# Patient Record
Sex: FEMALE | Race: BLACK OR AFRICAN AMERICAN | NOT HISPANIC OR LATINO | URBAN - METROPOLITAN AREA
[De-identification: names, ages, dates, MRNs, and addresses within clinical notes are randomized per-mention and may not be internally consistent; named-entity substitution may affect disease eponyms.]

---

## 2016-03-13 RX ORDER — ONDANSETRON 8 MG/1
1 TABLET, FILM COATED ORAL
Qty: 9 | Refills: 0 | OUTPATIENT
Start: 2016-03-13 | End: 2017-08-23

## 2016-11-17 RX ORDER — ESOMEPRAZOLE MAGNESIUM 40 MG/1
1 CAPSULE, DELAYED RELEASE ORAL
Qty: 30 | Refills: 0
Start: 2016-11-17 | End: 2017-09-19

## 2017-08-20 ENCOUNTER — EMERGENCY (EMERGENCY)
Facility: HOSPITAL | Age: 27
LOS: 1 days | Discharge: HOME | End: 2017-08-20
Attending: EMERGENCY MEDICINE
Payer: MEDICAID

## 2017-08-20 VITALS
RESPIRATION RATE: 18 BRPM | TEMPERATURE: 98 F | HEART RATE: 72 BPM | DIASTOLIC BLOOD PRESSURE: 70 MMHG | SYSTOLIC BLOOD PRESSURE: 131 MMHG | OXYGEN SATURATION: 100 %

## 2017-08-20 VITALS
RESPIRATION RATE: 18 BRPM | HEIGHT: 65 IN | HEART RATE: 100 BPM | DIASTOLIC BLOOD PRESSURE: 78 MMHG | TEMPERATURE: 99 F | SYSTOLIC BLOOD PRESSURE: 142 MMHG | OXYGEN SATURATION: 100 % | WEIGHT: 244.93 LBS

## 2017-08-20 DIAGNOSIS — K42.9 UMBILICAL HERNIA WITHOUT OBSTRUCTION OR GANGRENE: ICD-10-CM

## 2017-08-20 DIAGNOSIS — Z91.013 ALLERGY TO SEAFOOD: ICD-10-CM

## 2017-08-20 DIAGNOSIS — E66.9 OBESITY, UNSPECIFIED: ICD-10-CM

## 2017-08-20 DIAGNOSIS — K21.9 GASTRO-ESOPHAGEAL REFLUX DISEASE WITHOUT ESOPHAGITIS: ICD-10-CM

## 2017-08-20 DIAGNOSIS — R10.13 EPIGASTRIC PAIN: ICD-10-CM

## 2017-08-20 DIAGNOSIS — Z87.11 PERSONAL HISTORY OF PEPTIC ULCER DISEASE: ICD-10-CM

## 2017-08-20 DIAGNOSIS — R11.2 NAUSEA WITH VOMITING, UNSPECIFIED: ICD-10-CM

## 2017-08-20 DIAGNOSIS — K80.20 CALCULUS OF GALLBLADDER WITHOUT CHOLECYSTITIS WITHOUT OBSTRUCTION: ICD-10-CM

## 2017-08-20 LAB
ALBUMIN SERPL ELPH-MCNC: 3.7 G/DL — SIGNIFICANT CHANGE UP (ref 3.5–5)
ALP SERPL-CCNC: 110 U/L — SIGNIFICANT CHANGE UP (ref 40–120)
ALT FLD-CCNC: 26 U/L DA — SIGNIFICANT CHANGE UP (ref 10–60)
ANION GAP SERPL CALC-SCNC: 9 MMOL/L — SIGNIFICANT CHANGE UP (ref 5–17)
APPEARANCE UR: CLEAR — SIGNIFICANT CHANGE UP
AST SERPL-CCNC: 21 U/L — SIGNIFICANT CHANGE UP (ref 10–40)
BASOPHILS # BLD AUTO: 0.1 K/UL — SIGNIFICANT CHANGE UP (ref 0–0.2)
BASOPHILS NFR BLD AUTO: 1.3 % — SIGNIFICANT CHANGE UP (ref 0–2)
BILIRUB SERPL-MCNC: 0.5 MG/DL — SIGNIFICANT CHANGE UP (ref 0.2–1.2)
BILIRUB UR-MCNC: NEGATIVE — SIGNIFICANT CHANGE UP
BUN SERPL-MCNC: 11 MG/DL — SIGNIFICANT CHANGE UP (ref 7–18)
CALCIUM SERPL-MCNC: 8.9 MG/DL — SIGNIFICANT CHANGE UP (ref 8.4–10.5)
CHLORIDE SERPL-SCNC: 107 MMOL/L — SIGNIFICANT CHANGE UP (ref 96–108)
CO2 SERPL-SCNC: 24 MMOL/L — SIGNIFICANT CHANGE UP (ref 22–31)
COLOR SPEC: YELLOW — SIGNIFICANT CHANGE UP
CREAT SERPL-MCNC: 0.96 MG/DL — SIGNIFICANT CHANGE UP (ref 0.5–1.3)
DIFF PNL FLD: ABNORMAL
EOSINOPHIL # BLD AUTO: 0 K/UL — SIGNIFICANT CHANGE UP (ref 0–0.5)
EOSINOPHIL NFR BLD AUTO: 0.3 % — SIGNIFICANT CHANGE UP (ref 0–6)
GLUCOSE SERPL-MCNC: 118 MG/DL — HIGH (ref 70–99)
GLUCOSE UR QL: NEGATIVE — SIGNIFICANT CHANGE UP
HCG SERPL-ACNC: <1 MIU/ML — SIGNIFICANT CHANGE UP
HCG UR QL: NEGATIVE — SIGNIFICANT CHANGE UP
HCT VFR BLD CALC: 42.2 % — SIGNIFICANT CHANGE UP (ref 34.5–45)
HGB BLD-MCNC: 14 G/DL — SIGNIFICANT CHANGE UP (ref 11.5–15.5)
KETONES UR-MCNC: ABNORMAL
LEUKOCYTE ESTERASE UR-ACNC: NEGATIVE — SIGNIFICANT CHANGE UP
LIDOCAIN IGE QN: 89 U/L — SIGNIFICANT CHANGE UP (ref 73–393)
LYMPHOCYTES # BLD AUTO: 1.6 K/UL — SIGNIFICANT CHANGE UP (ref 1–3.3)
LYMPHOCYTES # BLD AUTO: 16.9 % — SIGNIFICANT CHANGE UP (ref 13–44)
MCHC RBC-ENTMCNC: 25.8 PG — LOW (ref 27–34)
MCHC RBC-ENTMCNC: 33.1 GM/DL — SIGNIFICANT CHANGE UP (ref 32–36)
MCV RBC AUTO: 77.9 FL — LOW (ref 80–100)
MONOCYTES # BLD AUTO: 0.4 K/UL — SIGNIFICANT CHANGE UP (ref 0–0.9)
MONOCYTES NFR BLD AUTO: 4.3 % — SIGNIFICANT CHANGE UP (ref 2–14)
NEUTROPHILS # BLD AUTO: 7.1 K/UL — SIGNIFICANT CHANGE UP (ref 1.8–7.4)
NEUTROPHILS NFR BLD AUTO: 77.2 % — HIGH (ref 43–77)
NITRITE UR-MCNC: NEGATIVE — SIGNIFICANT CHANGE UP
OB PNL STL: NEGATIVE — SIGNIFICANT CHANGE UP
PH UR: 6 — SIGNIFICANT CHANGE UP (ref 5–8)
PLATELET # BLD AUTO: 358 K/UL — SIGNIFICANT CHANGE UP (ref 150–400)
POTASSIUM SERPL-MCNC: 4.3 MMOL/L — SIGNIFICANT CHANGE UP (ref 3.5–5.3)
POTASSIUM SERPL-SCNC: 4.3 MMOL/L — SIGNIFICANT CHANGE UP (ref 3.5–5.3)
PROT SERPL-MCNC: 8.6 G/DL — HIGH (ref 6–8.3)
PROT UR-MCNC: 100
RBC # BLD: 5.42 M/UL — HIGH (ref 3.8–5.2)
RBC # FLD: 14.2 % — SIGNIFICANT CHANGE UP (ref 10.3–14.5)
SODIUM SERPL-SCNC: 140 MMOL/L — SIGNIFICANT CHANGE UP (ref 135–145)
SP GR SPEC: 1.02 — SIGNIFICANT CHANGE UP (ref 1.01–1.02)
UROBILINOGEN FLD QL: 1
WBC # BLD: 9.2 K/UL — SIGNIFICANT CHANGE UP (ref 3.8–10.5)
WBC # FLD AUTO: 9.2 K/UL — SIGNIFICANT CHANGE UP (ref 3.8–10.5)

## 2017-08-20 PROCEDURE — 81025 URINE PREGNANCY TEST: CPT

## 2017-08-20 PROCEDURE — 76705 ECHO EXAM OF ABDOMEN: CPT

## 2017-08-20 PROCEDURE — 74174 CTA ABD&PLVS W/CONTRAST: CPT

## 2017-08-20 PROCEDURE — 99285 EMERGENCY DEPT VISIT HI MDM: CPT

## 2017-08-20 PROCEDURE — 81001 URINALYSIS AUTO W/SCOPE: CPT

## 2017-08-20 PROCEDURE — 84702 CHORIONIC GONADOTROPIN TEST: CPT

## 2017-08-20 PROCEDURE — 36415 COLL VENOUS BLD VENIPUNCTURE: CPT

## 2017-08-20 PROCEDURE — 96376 TX/PRO/DX INJ SAME DRUG ADON: CPT

## 2017-08-20 PROCEDURE — 96375 TX/PRO/DX INJ NEW DRUG ADDON: CPT

## 2017-08-20 PROCEDURE — 82272 OCCULT BLD FECES 1-3 TESTS: CPT

## 2017-08-20 PROCEDURE — 96374 THER/PROPH/DIAG INJ IV PUSH: CPT | Mod: XU

## 2017-08-20 PROCEDURE — 76705 ECHO EXAM OF ABDOMEN: CPT | Mod: 26

## 2017-08-20 PROCEDURE — 85027 COMPLETE CBC AUTOMATED: CPT

## 2017-08-20 PROCEDURE — 74174 CTA ABD&PLVS W/CONTRAST: CPT | Mod: 26

## 2017-08-20 PROCEDURE — 71275 CT ANGIOGRAPHY CHEST: CPT | Mod: 26

## 2017-08-20 PROCEDURE — 71275 CT ANGIOGRAPHY CHEST: CPT

## 2017-08-20 PROCEDURE — 83690 ASSAY OF LIPASE: CPT

## 2017-08-20 PROCEDURE — 99284 EMERGENCY DEPT VISIT MOD MDM: CPT | Mod: 25

## 2017-08-20 PROCEDURE — 80053 COMPREHEN METABOLIC PANEL: CPT

## 2017-08-20 RX ORDER — MORPHINE SULFATE 50 MG/1
4 CAPSULE, EXTENDED RELEASE ORAL ONCE
Qty: 0 | Refills: 0 | Status: DISCONTINUED | OUTPATIENT
Start: 2017-08-20 | End: 2017-08-20

## 2017-08-20 RX ORDER — FAMOTIDINE 10 MG/ML
20 INJECTION INTRAVENOUS ONCE
Qty: 0 | Refills: 0 | Status: COMPLETED | OUTPATIENT
Start: 2017-08-20 | End: 2017-08-20

## 2017-08-20 RX ORDER — ONDANSETRON 8 MG/1
4 TABLET, FILM COATED ORAL ONCE
Qty: 0 | Refills: 0 | Status: COMPLETED | OUTPATIENT
Start: 2017-08-20 | End: 2017-08-20

## 2017-08-20 RX ORDER — LIDOCAINE 4 G/100G
10 CREAM TOPICAL ONCE
Qty: 0 | Refills: 0 | Status: COMPLETED | OUTPATIENT
Start: 2017-08-20 | End: 2017-08-20

## 2017-08-20 RX ORDER — SODIUM CHLORIDE 9 MG/ML
1000 INJECTION INTRAMUSCULAR; INTRAVENOUS; SUBCUTANEOUS ONCE
Qty: 0 | Refills: 0 | Status: COMPLETED | OUTPATIENT
Start: 2017-08-20 | End: 2017-08-20

## 2017-08-20 RX ADMIN — ONDANSETRON 4 MILLIGRAM(S): 8 TABLET, FILM COATED ORAL at 11:41

## 2017-08-20 RX ADMIN — Medication 30 MILLILITER(S): at 12:45

## 2017-08-20 RX ADMIN — FAMOTIDINE 20 MILLIGRAM(S): 10 INJECTION INTRAVENOUS at 12:46

## 2017-08-20 RX ADMIN — MORPHINE SULFATE 4 MILLIGRAM(S): 50 CAPSULE, EXTENDED RELEASE ORAL at 15:56

## 2017-08-20 RX ADMIN — ONDANSETRON 4 MILLIGRAM(S): 8 TABLET, FILM COATED ORAL at 17:05

## 2017-08-20 RX ADMIN — FAMOTIDINE 20 MILLIGRAM(S): 10 INJECTION INTRAVENOUS at 17:05

## 2017-08-20 RX ADMIN — MORPHINE SULFATE 4 MILLIGRAM(S): 50 CAPSULE, EXTENDED RELEASE ORAL at 14:42

## 2017-08-20 RX ADMIN — SODIUM CHLORIDE 2000 MILLILITER(S): 9 INJECTION INTRAMUSCULAR; INTRAVENOUS; SUBCUTANEOUS at 11:41

## 2017-08-20 RX ADMIN — LIDOCAINE 10 MILLILITER(S): 4 CREAM TOPICAL at 12:45

## 2017-08-20 NOTE — ED PROVIDER NOTE - ATTENDING CONTRIBUTION TO CARE
** Delayed chart entry (item noted to be missing following audit)**   28 y/o with epigastric abd pain. exam w/o significant ttp. Pt with known history of GERD and possible peptic ulcer will give GI cocktail, check labs, hepatic sonogram due to body habitus.

## 2017-08-20 NOTE — ED PROVIDER NOTE - MEDICAL DECISION MAKING DETAILS
26 y/o with epigastric abd pain. Pt with known history of GERD and possible peptic ulcer will give GI cocktail, check labs, hepatic sonogram due to body habitus. Contact Dr. Saldana for consult.

## 2017-08-20 NOTE — ED PROVIDER NOTE - PROGRESS NOTE DETAILS
Pt confirms resolution of vomiting, and decreased epigastric discomfort, sx suggestive of pt ulceration flair, attempted to consult with pt GI MD Dr. GIOVANNA Ibarra,to no avail, will place pt with care coordinator, send meds to pharmacy.

## 2017-08-20 NOTE — ED PROVIDER NOTE - OBJECTIVE STATEMENT
28 y/o F pt with PMHx of GERD, obesity and ulcer( possibly stomach) diagnosed via endoscopy (12/2016) and is healing presents to the ED c/o sharp, epigastric abd pain  vomiting with streaks of blood, soft stools x1 day. Pt states she feels when ulcer had flared up in the past; used to take Omeprazole does not require it anymore. Pt denies any fever, chills, hematuria, melena, HA, dizziness, sick contacts, new foods, or any other complaints.  Allergies: Shellfish

## 2019-10-24 ENCOUNTER — EMERGENCY (EMERGENCY)
Facility: HOSPITAL | Age: 29
LOS: 1 days | Discharge: HOME | End: 2019-10-24
Attending: EMERGENCY MEDICINE
Payer: COMMERCIAL

## 2019-10-24 VITALS
HEART RATE: 78 BPM | SYSTOLIC BLOOD PRESSURE: 104 MMHG | TEMPERATURE: 98 F | WEIGHT: 249.12 LBS | HEIGHT: 64 IN | DIASTOLIC BLOOD PRESSURE: 63 MMHG | RESPIRATION RATE: 19 BRPM | OXYGEN SATURATION: 99 %

## 2019-10-24 PROCEDURE — 99284 EMERGENCY DEPT VISIT MOD MDM: CPT

## 2019-10-24 PROCEDURE — 93971 EXTREMITY STUDY: CPT

## 2019-10-24 PROCEDURE — 99284 EMERGENCY DEPT VISIT MOD MDM: CPT | Mod: 25

## 2019-10-24 PROCEDURE — 93971 EXTREMITY STUDY: CPT | Mod: 26,RT

## 2019-10-24 RX ORDER — IBUPROFEN 200 MG
1 TABLET ORAL
Qty: 28 | Refills: 0
Start: 2019-10-24 | End: 2019-10-30

## 2019-10-24 NOTE — ED PROVIDER NOTE - PATIENT PORTAL LINK FT
You can access the FollowMyHealth Patient Portal offered by [facility] by registering at the following website: [email or web address]. By joining [employer] [facility]’s FollowMyHealth portal, you will also be able to view your health information using other applications (apps) compatible with our system.

## 2019-10-24 NOTE — ED PROVIDER NOTE - OBJECTIVE STATEMENT
30 y/o F pt with a PMHx of DVT (October 2016), GERD, Obesity, and Ulcers presents to ED c/o R calf pain and swelling associated with SOB x3 days. Pt endorses similar feeling to her previous DVT in October of 2016. Pt states she is a  and relates a familial Hx of DM from both sides. Pt denies cough, cold, runny nose, stuffy nose, or any other acute complaints. NKDA.

## 2019-10-24 NOTE — ED PROVIDER NOTE - PMH
DVT (deep venous thrombosis)    GERD (gastroesophageal reflux disease)    Obesity (BMI 30-39.9)    Ulcer

## 2019-10-25 PROBLEM — E66.9 OBESITY, UNSPECIFIED: Chronic | Status: ACTIVE | Noted: 2017-08-24

## 2019-10-25 PROBLEM — K21.9 GASTRO-ESOPHAGEAL REFLUX DISEASE WITHOUT ESOPHAGITIS: Chronic | Status: ACTIVE | Noted: 2017-08-24

## 2019-10-25 PROBLEM — L98.499 NON-PRESSURE CHRONIC ULCER OF SKIN OF OTHER SITES WITH UNSPECIFIED SEVERITY: Chronic | Status: ACTIVE | Noted: 2017-08-24

## 2019-11-13 PROBLEM — I82.409 ACUTE EMBOLISM AND THROMBOSIS OF UNSPECIFIED DEEP VEINS OF UNSPECIFIED LOWER EXTREMITY: Chronic | Status: ACTIVE | Noted: 2019-10-24

## 2020-01-15 ENCOUNTER — APPOINTMENT (OUTPATIENT)
Dept: NEUROLOGY | Facility: CLINIC | Age: 30
End: 2020-01-15
Payer: COMMERCIAL

## 2020-01-15 VITALS
DIASTOLIC BLOOD PRESSURE: 74 MMHG | HEART RATE: 86 BPM | HEIGHT: 65 IN | WEIGHT: 266 LBS | OXYGEN SATURATION: 98 % | SYSTOLIC BLOOD PRESSURE: 136 MMHG | BODY MASS INDEX: 44.32 KG/M2

## 2020-01-15 DIAGNOSIS — Z87.898 PERSONAL HISTORY OF OTHER SPECIFIED CONDITIONS: ICD-10-CM

## 2020-01-15 DIAGNOSIS — R20.0 ANESTHESIA OF SKIN: ICD-10-CM

## 2020-01-15 DIAGNOSIS — Z78.9 OTHER SPECIFIED HEALTH STATUS: ICD-10-CM

## 2020-01-15 DIAGNOSIS — Z86.718 PERSONAL HISTORY OF OTHER VENOUS THROMBOSIS AND EMBOLISM: ICD-10-CM

## 2020-01-15 DIAGNOSIS — Z83.3 FAMILY HISTORY OF DIABETES MELLITUS: ICD-10-CM

## 2020-01-15 DIAGNOSIS — R20.2 ANESTHESIA OF SKIN: ICD-10-CM

## 2020-01-15 DIAGNOSIS — Z82.5 FAMILY HISTORY OF ASTHMA AND OTHER CHRONIC LOWER RESPIRATORY DISEASES: ICD-10-CM

## 2020-01-15 DIAGNOSIS — Z81.1 FAMILY HISTORY OF ALCOHOL ABUSE AND DEPENDENCE: ICD-10-CM

## 2020-01-15 PROCEDURE — 99205 OFFICE O/P NEW HI 60 MIN: CPT

## 2020-01-15 NOTE — REVIEW OF SYSTEMS
[As Noted in HPI] : as noted in HPI [Fever] : no fever [Anxiety] : no anxiety [Eyesight Problems] : no eyesight problems [Loss Of Hearing] : no hearing loss [Chest Pain] : no chest pain [Cough] : no cough [Vomiting] : no vomiting [Incontinence] : no incontinence [Joint Pain] : no joint pain [Itching] : no itching [Muscle Weakness] : no muscle weakness [Easy Bleeding] : no tendency for easy bleeding

## 2020-01-15 NOTE — PHYSICAL EXAM
[General Appearance - In No Acute Distress] : in no acute distress [General Appearance - Alert] : alert [Person] : oriented to person [Place] : oriented to place [Time] : oriented to time [Registration Intact] : recent registration memory intact [Concentration Intact] : normal concentrating ability [Visual Intact] : visual attention was ~T not ~L decreased [Naming Objects] : no difficulty naming common objects [Repeating Phrases] : no difficulty repeating a phrase [Fluency] : fluency intact [Comprehension] : comprehension intact [Vocabulary] : adequate range of vocabulary [Cranial Nerves Optic (II)] : visual acuity intact bilaterally,  visual fields full to confrontation, pupils equal round and reactive to light [Cranial Nerves Oculomotor (III)] : extraocular motion intact [Cranial Nerves Trigeminal (V)] : facial sensation intact symmetrically [Cranial Nerves Facial (VII)] : face symmetrical [Cranial Nerves Vestibulocochlear (VIII)] : hearing was intact bilaterally [Cranial Nerves Glossopharyngeal (IX)] : tongue and palate midline [Cranial Nerves Accessory (XI - Cranial And Spinal)] : head turning and shoulder shrug symmetric [Cranial Nerves Hypoglossal (XII)] : there was no tongue deviation with protrusion [Motor Tone] : muscle tone was normal in all four extremities [Motor Strength] : muscle strength was normal in all four extremities [Involuntary Movements] : no involuntary movements were seen [Sensation Tactile Decrease] : light touch was intact [Sensation Pain / Temperature Decrease] : pain and temperature was intact [Sensation Vibration Decrease] : vibration was intact [Proprioception] : proprioception was intact [Abnormal Walk] : normal gait [Balance] : balance was intact [1+] : Ankle jerk left 1+ [Full Pulse] : the pedal pulses are present [Romberg's Sign] : Romberg's sign was negtive [Coordination - Dysmetria Impaired Finger-to-Nose Bilateral] : not present [Coordination - Dysmetria Impaired Heel-to-Shin Bilateral] : not present [Plantar Reflex Right Only] : normal on the right [Plantar Reflex Left Only] : normal on the left

## 2020-01-15 NOTE — HISTORY OF PRESENT ILLNESS
[FreeTextEntry1] : The patient is here for numbness and tingling in bilateral arms and legs started summer 2019.  There is no weakness in the arms or the legs.  The patient denies any neck or back pain.  The patient has family history of diabetic neuropathy.\par \par No difficulty with balance, walking or dizziness.  \par

## 2020-01-15 NOTE — ASSESSMENT
[FreeTextEntry1] : Numbness and tingling bilateral arms and legs concerning for neuropathy\par will check basic neuropathy labs, ncs/emg and start Cymbalta 30mg daily

## 2020-01-15 NOTE — DATA REVIEWED
[de-identified] : Glu 118\par \par EXAM:  US DPLX LWR EXT VEINS LTD RT                      \par \par \par PROCEDURE DATE:  10/24/2019  \par \par \par \par INTERPRETATION:  Date of study: 10/24/19.\par \par CLINICAL INFORMATION: Right calf pain and swelling - r/o DVT.\par \par COMPARISON: None available.\par \par TECHNIQUE: Duplex sonography of the RIGHT LOWER extremity veins with \par color and spectral Doppler, with and without compression.  \par \par FINDINGS:\par \par There is normal compressibility of the right common femoral, femoral and \par popliteal veins. \par \par Doppler examination shows normal spontaneous and phasic flow.\par \par No calf vein thrombosis is detected.\par \par IMPRESSION: \par \par No evidence of right lower extremity deep venous thrombosis.\par \par \par \par \par \par \par \par \par JENNY VICK M.D., ATTENDING RADIOLOGIST\par This document has been electronically signed. Oct 24 2019  7:34PM\par   \par last ED note appreciated

## 2020-08-26 ENCOUNTER — LABORATORY RESULT (OUTPATIENT)
Age: 30
End: 2020-08-26

## 2020-08-26 ENCOUNTER — APPOINTMENT (OUTPATIENT)
Dept: INTERNAL MEDICINE | Facility: CLINIC | Age: 30
End: 2020-08-26
Payer: COMMERCIAL

## 2020-08-26 VITALS
SYSTOLIC BLOOD PRESSURE: 114 MMHG | BODY MASS INDEX: 43.99 KG/M2 | RESPIRATION RATE: 16 BRPM | DIASTOLIC BLOOD PRESSURE: 75 MMHG | HEART RATE: 78 BPM | OXYGEN SATURATION: 97 % | TEMPERATURE: 98 F | HEIGHT: 65 IN | WEIGHT: 264 LBS

## 2020-08-26 DIAGNOSIS — Z00.00 ENCOUNTER FOR GENERAL ADULT MEDICAL EXAMINATION W/OUT ABNORMAL FINDINGS: ICD-10-CM

## 2020-08-26 DIAGNOSIS — Z11.59 ENCOUNTER FOR SCREENING FOR OTHER VIRAL DISEASES: ICD-10-CM

## 2020-08-26 PROCEDURE — 99385 PREV VISIT NEW AGE 18-39: CPT | Mod: 25

## 2020-08-26 PROCEDURE — 99202 OFFICE O/P NEW SF 15 MIN: CPT

## 2020-08-26 RX ORDER — DULOXETINE HYDROCHLORIDE 30 MG/1
30 CAPSULE, DELAYED RELEASE PELLETS ORAL
Qty: 30 | Refills: 5 | Status: DISCONTINUED | COMMUNITY
Start: 2020-01-15 | End: 2020-08-26

## 2020-08-26 RX ORDER — ASPIRIN 81 MG
81 TABLET, DELAYED RELEASE (ENTERIC COATED) ORAL
Qty: 30 | Refills: 2 | Status: ACTIVE | COMMUNITY

## 2020-08-26 RX ORDER — AMOXICILLIN 500 MG/1
CAPSULE ORAL
Refills: 0 | Status: DISCONTINUED | COMMUNITY
End: 2020-08-26

## 2020-09-01 NOTE — REVIEW OF SYSTEMS
[Fever] : no fever [Chills] : no chills [Fatigue] : no fatigue [Discharge] : no discharge [Pain] : no pain [Redness] : no redness [Vision Problems] : no vision problems [Itching] : no itching [Earache] : no earache [Hearing Loss] : no hearing loss [Nasal Discharge] : no nasal discharge [Sore Throat] : no sore throat [Chest Pain] : no chest pain [Palpitations] : no palpitations [Leg Claudication] : no leg claudication [Orthopnea] : no orthopnea [Paroxysmal Nocturnal Dyspnea] : no paroxysmal nocturnal dyspnea [Shortness Of Breath] : no shortness of breath [Wheezing] : no wheezing [Cough] : no cough [Dyspnea on Exertion] : no dyspnea on exertion [Abdominal Pain] : no abdominal pain [Nausea] : no nausea [Constipation] : no constipation [Diarrhea] : diarrhea [Vomiting] : no vomiting [Heartburn] : no heartburn [Melena] : no melena [Dysuria] : no dysuria [Incontinence] : no incontinence [Hematuria] : no hematuria [Frequency] : no frequency [Joint Pain] : no joint pain [Joint Stiffness] : no joint stiffness [Joint Swelling] : no joint swelling [Muscle Pain] : no muscle pain [Back Pain] : no back pain [Mole Changes] : no mole changes [Skin Rash] : no skin rash [Headache] : no headache [Dizziness] : no dizziness [Fainting] : no fainting [Memory Loss] : no memory loss [Unsteady Walking] : no ataxia [Suicidal] : not suicidal [Insomnia] : no insomnia [Anxiety] : no anxiety [Depression] : no depression [de-identified] : as per HPI

## 2020-09-01 NOTE — ASSESSMENT
[FreeTextEntry1] : has STDtest done with her gyn in 08/2020 with negative results per pt \par \par decline Tdap and flushot

## 2020-09-01 NOTE — HEALTH RISK ASSESSMENT
[Yes] : Yes [1 or 2 (0 pts)] : 1 or 2 (0 points) [Monthly or less (1 pt)] : Monthly or less (1 point) [Never (0 pts)] : Never (0 points) [No] : In the past 12 months have you used drugs other than those required for medical reasons? No [No falls in past year] : Patient reported no falls in the past year [0] : 2) Feeling down, depressed, or hopeless: Not at all (0) [Patient reported PAP Smear was normal] : Patient reported PAP Smear was normal [HIV Test offered] : HIV Test offered [Hepatitis C test offered] : Hepatitis C test offered [With Family] : lives with family [Sexually Active] : sexually active [Employed] : employed [Feels Safe at Home] : Feels safe at home [Fully functional (using the telephone, shopping, preparing meals, housekeeping, doing laundry, using] : Fully functional and needs no help or supervision to perform IADLs (using the telephone, shopping, preparing meals, housekeeping, doing laundry, using transportation, managing medications and managing finances) [Fully functional (bathing, dressing, toileting, transferring, walking, feeding)] : Fully functional (bathing, dressing, toileting, transferring, walking, feeding) [] : No [Audit-CScore] : 1 [EJH2Scawm] : 0 [Change in mental status noted] : No change in mental status noted [Language] : denies difficulty with language [Behavior] : denies difficulty with behavior [Learning/Retaining New Information] : denies difficulty learning/retaining new information [Handling Complex Tasks] : denies difficulty handling complex tasks [Reasoning] : denies difficulty with reasoning [Spatial Ability and Orientation] : denies difficulty with spatial ability and orientation [High Risk Behavior] : no high risk behavior [PapSmearDate] : 08/2020 [FreeTextEntry2] : as parking   [de-identified] : LONG TERM RELATIONSHIP WITH ONE MALE PARTNER WITHOUT PROTECTION

## 2020-09-01 NOTE — HISTORY OF PRESENT ILLNESS
[de-identified] : 30  female w/pmhx of unprovoked DVT 10/2016, non bleeding gastric ulcer,obesity, GERD, cholelithiasis, NUMBNESS of b/l hand comes in to establish care \par \par -last EGD in 2018; now has appointment with  in 09/2020; \par \par -saw GYN in 08/2020 with normal pap per pt\par \par -Pt was seeing hematologist every 3 months; will see her today; has work up done of DVT per pt; denies of legs pain or swelling or SOB now \par \par -numbness and tingling of b/l arms and legs; saw neuro has not done any labs as ordered \par -LMP: 08/26/2020; regular period\par \par -EATING HEALTHY BUT NOT EXERCISE; STATE WALK A LOT AT HER WORK

## 2020-09-01 NOTE — PHYSICAL EXAM
[No Acute Distress] : no acute distress [Well Nourished] : well nourished [Well Developed] : well developed [Normal Sclera/Conjunctiva] : normal sclera/conjunctiva [PERRL] : pupils equal round and reactive to light [EOMI] : extraocular movements intact [Normal Outer Ear/Nose] : the outer ears and nose were normal in appearance [Normal Oropharynx] : the oropharynx was normal [No JVD] : no jugular venous distention [No Lymphadenopathy] : no lymphadenopathy [Supple] : supple [No Respiratory Distress] : no respiratory distress  [No Accessory Muscle Use] : no accessory muscle use [Clear to Auscultation] : lungs were clear to auscultation bilaterally [Normal Rate] : normal rate  [Regular Rhythm] : with a regular rhythm [Normal S1, S2] : normal S1 and S2 [No Murmur] : no murmur heard [Pedal Pulses Present] : the pedal pulses are present [No Edema] : there was no peripheral edema [Soft] : abdomen soft [Non Tender] : non-tender [Non-distended] : non-distended [No Masses] : no abdominal mass palpated [Normal Bowel Sounds] : normal bowel sounds [Normal Posterior Cervical Nodes] : no posterior cervical lymphadenopathy [Normal Anterior Cervical Nodes] : no anterior cervical lymphadenopathy [No CVA Tenderness] : no CVA  tenderness [No Spinal Tenderness] : no spinal tenderness [No Joint Swelling] : no joint swelling [Grossly Normal Strength/Tone] : grossly normal strength/tone [Coordination Grossly Intact] : coordination grossly intact [No Focal Deficits] : no focal deficits [Normal Gait] : normal gait [Speech Grossly Normal] : speech grossly normal [Memory Grossly Normal] : memory grossly normal [Normal Affect] : the affect was normal [Alert and Oriented x3] : oriented to person, place, and time [Normal Mood] : the mood was normal [Normal Insight/Judgement] : insight and judgment were intact

## 2020-09-07 DIAGNOSIS — R73.03 PREDIABETES.: ICD-10-CM

## 2020-09-07 DIAGNOSIS — R82.90 UNSPECIFIED ABNORMAL FINDINGS IN URINE: ICD-10-CM

## 2020-09-07 DIAGNOSIS — E78.5 HYPERLIPIDEMIA, UNSPECIFIED: ICD-10-CM

## 2020-09-07 LAB
25(OH)D3 SERPL-MCNC: 34.7 NG/ML
ALBUMIN MFR SERPL ELPH: 53.1 %
ALBUMIN SERPL ELPH-MCNC: 4 G/DL
ALBUMIN SERPL-MCNC: 3.8 G/DL
ALBUMIN/GLOB SERPL: 1.2 RATIO
ALP BLD-CCNC: 98 U/L
ALPHA1 GLOB MFR SERPL ELPH: 4.9 %
ALPHA1 GLOB SERPL ELPH-MCNC: 0.3 G/DL
ALPHA2 GLOB MFR SERPL ELPH: 9.6 %
ALPHA2 GLOB SERPL ELPH-MCNC: 0.7 G/DL
ALT SERPL-CCNC: 18 U/L
ANION GAP SERPL CALC-SCNC: 14 MMOL/L
APPEARANCE: CLEAR
AST SERPL-CCNC: 21 U/L
B-GLOBULIN MFR SERPL ELPH: 12.4 %
B-GLOBULIN SERPL ELPH-MCNC: 0.9 G/DL
BASOPHILS # BLD AUTO: 0.04 K/UL
BASOPHILS NFR BLD AUTO: 0.5 %
BILIRUB SERPL-MCNC: <0.2 MG/DL
BILIRUBIN URINE: NEGATIVE
BLOOD URINE: ABNORMAL
BUN SERPL-MCNC: 13 MG/DL
CALCIUM SERPL-MCNC: 9.2 MG/DL
CHLORIDE SERPL-SCNC: 101 MMOL/L
CHOLEST SERPL-MCNC: 216 MG/DL
CHOLEST/HDLC SERPL: 4.4 RATIO
CO2 SERPL-SCNC: 26 MMOL/L
COLOR: YELLOW
CREAT SERPL-MCNC: 0.84 MG/DL
EOSINOPHIL # BLD AUTO: 0.2 K/UL
EOSINOPHIL NFR BLD AUTO: 2.7 %
ESTIMATED AVERAGE GLUCOSE: 120 MG/DL
FOLATE RBC-MCNC: 1323 NG/ML
GAMMA GLOB FLD ELPH-MCNC: 1.4 G/DL
GAMMA GLOB MFR SERPL ELPH: 20 %
GLUCOSE QUALITATIVE U: NEGATIVE
GLUCOSE SERPL-MCNC: 116 MG/DL
HBA1C MFR BLD HPLC: 5.8 %
HCT VFR BLD CALC: 39.9 %
HCT VFR BLD CALC: 40.3 %
HCYS SERPL-MCNC: 7.6 UMOL/L
HDLC SERPL-MCNC: 49 MG/DL
HGB BLD-MCNC: 12.9 G/DL
IMM GRANULOCYTES NFR BLD AUTO: 0.3 %
INTERPRETATION SERPL IEP-IMP: NORMAL
KETONES URINE: NORMAL
LDLC SERPL CALC-MCNC: 150 MG/DL
LEUKOCYTE ESTERASE URINE: NEGATIVE
LYMPHOCYTES # BLD AUTO: 2.86 K/UL
LYMPHOCYTES NFR BLD AUTO: 39.1 %
M PROTEIN SPEC IFE-MCNC: NORMAL
MAN DIFF?: NORMAL
MCHC RBC-ENTMCNC: 24.4 PG
MCHC RBC-ENTMCNC: 32 GM/DL
MCV RBC AUTO: 76.2 FL
METHYLMALONATE SERPL-SCNC: 95 NMOL/L
MONOCYTES # BLD AUTO: 0.48 K/UL
MONOCYTES NFR BLD AUTO: 6.6 %
NEUTROPHILS # BLD AUTO: 3.72 K/UL
NEUTROPHILS NFR BLD AUTO: 50.8 %
NITRITE URINE: NEGATIVE
PH URINE: 6
PLATELET # BLD AUTO: 378 K/UL
POTASSIUM SERPL-SCNC: 4.3 MMOL/L
PROT SERPL-MCNC: 7.1 G/DL
PROTEIN URINE: ABNORMAL
RBC # BLD: 5.29 M/UL
RBC # FLD: 16.2 %
SARS-COV-2 IGG SERPL IA-ACNC: <3.8 AU/ML
SARS-COV-2 IGG SERPL QL IA: NEGATIVE
SODIUM SERPL-SCNC: 141 MMOL/L
SPECIFIC GRAVITY URINE: 1.03
T3 SERPL-MCNC: 135 NG/DL
T4 FREE SERPL-MCNC: 1.1 NG/DL
TRIGL SERPL-MCNC: 83 MG/DL
TSH SERPL-ACNC: 1.86 UIU/ML
UROBILINOGEN URINE: NORMAL
VIT B12 SERPL-MCNC: 1341 PG/ML
WBC # FLD AUTO: 7.32 K/UL

## 2020-09-10 ENCOUNTER — APPOINTMENT (OUTPATIENT)
Dept: DERMATOLOGY | Facility: CLINIC | Age: 30
End: 2020-09-10
Payer: COMMERCIAL

## 2020-09-10 DIAGNOSIS — L21.9 SEBORRHEIC DERMATITIS, UNSPECIFIED: ICD-10-CM

## 2020-09-10 DIAGNOSIS — L70.0 ACNE VULGARIS: ICD-10-CM

## 2020-09-10 DIAGNOSIS — D22.9 MELANOCYTIC NEVI, UNSPECIFIED: ICD-10-CM

## 2020-09-10 DIAGNOSIS — Z12.83 ENCOUNTER FOR SCREENING FOR MALIGNANT NEOPLASM OF SKIN: ICD-10-CM

## 2020-09-10 DIAGNOSIS — Z87.2 PERSONAL HISTORY OF DISEASES OF THE SKIN AND SUBCUTANEOUS TISSUE: ICD-10-CM

## 2020-09-10 DIAGNOSIS — Z84.0 FAMILY HISTORY OF DISEASES OF THE SKIN AND SUBCUTANEOUS TISSUE: ICD-10-CM

## 2020-09-10 PROCEDURE — 99203 OFFICE O/P NEW LOW 30 MIN: CPT | Mod: GC

## 2020-09-10 RX ORDER — FLUOCINOLONE ACETONIDE 0.11 MG/ML
0.01 OIL TOPICAL
Qty: 1 | Refills: 2 | Status: ACTIVE | COMMUNITY
Start: 2020-09-10 | End: 1900-01-01

## 2020-09-18 ENCOUNTER — TRANSCRIPTION ENCOUNTER (OUTPATIENT)
Age: 30
End: 2020-09-18

## 2020-09-18 ENCOUNTER — APPOINTMENT (OUTPATIENT)
Dept: NEUROLOGY | Facility: CLINIC | Age: 30
End: 2020-09-18
Payer: COMMERCIAL

## 2020-09-18 VITALS
WEIGHT: 265 LBS | SYSTOLIC BLOOD PRESSURE: 136 MMHG | TEMPERATURE: 98.1 F | DIASTOLIC BLOOD PRESSURE: 81 MMHG | OXYGEN SATURATION: 98 % | HEIGHT: 65 IN | BODY MASS INDEX: 44.15 KG/M2 | HEART RATE: 88 BPM

## 2020-09-18 PROCEDURE — 95886 MUSC TEST DONE W/N TEST COMP: CPT

## 2020-09-18 PROCEDURE — 99214 OFFICE O/P EST MOD 30 MIN: CPT

## 2020-09-18 PROCEDURE — 95912 NRV CNDJ TEST 11-12 STUDIES: CPT

## 2020-09-18 NOTE — ASSESSMENT
[FreeTextEntry1] : Numbness and tingling bilateral arms and legs with  normal ncs/emg in pre-DM patient concerning for small fiber neuropathy\par will start Cymbalta 30mg daily. \par get skin biopsy for small fiber neuropathy\par patient advised on weight loss anf working with her PMD to manage pre-DM

## 2020-09-18 NOTE — DATA REVIEWED
[de-identified] : basic neuropathy labs notable for HbA1C of 5.8\par ncs.emg  normal\par PMD note appreciated

## 2020-09-18 NOTE — PHYSICAL EXAM
[General Appearance - Alert] : alert [General Appearance - In No Acute Distress] : in no acute distress [Person] : oriented to person [Place] : oriented to place [Time] : oriented to time [Concentration Intact] : normal concentrating ability [Naming Objects] : no difficulty naming common objects [Fluency] : fluency intact [Comprehension] : comprehension intact [Vocabulary] : adequate range of vocabulary [Motor Tone] : muscle tone was normal in all four extremities [Motor Strength] : muscle strength was normal in all four extremities [Involuntary Movements] : no involuntary movements were seen [Sensation Tactile Decrease] : light touch was intact [Abnormal Walk] : normal gait [Balance] : balance was intact [FreeTextEntry7] : stocking glove sensory loss bl arms and legs to pp

## 2020-09-18 NOTE — HISTORY OF PRESENT ILLNESS
[FreeTextEntry1] : The patient is here for numbness and tingling in bilateral arms and legs started summer 2019. There is no weakness in the arms or the legs. The patient denies any neck or back pain. The patient has family history of diabetic neuropathy.\par \par No difficulty with balance, walking or dizziness. \par The patient has not yet tried the Cymbalta.\par

## 2020-09-20 ENCOUNTER — TRANSCRIPTION ENCOUNTER (OUTPATIENT)
Age: 30
End: 2020-09-20

## 2020-09-20 RX ORDER — OMEPRAZOLE 20 MG
20 TABLET, DELAYED RELEASE (ENTERIC COATED) ORAL
Qty: 30 | Refills: 2 | Status: ACTIVE | COMMUNITY
Start: 1900-01-01 | End: 1900-01-01

## 2020-09-23 ENCOUNTER — APPOINTMENT (OUTPATIENT)
Dept: NEUROLOGY | Facility: CLINIC | Age: 30
End: 2020-09-23
Payer: COMMERCIAL

## 2020-09-23 VITALS
TEMPERATURE: 98.6 F | BODY MASS INDEX: 44.15 KG/M2 | WEIGHT: 265 LBS | HEIGHT: 65 IN | DIASTOLIC BLOOD PRESSURE: 91 MMHG | OXYGEN SATURATION: 98 % | HEART RATE: 74 BPM | SYSTOLIC BLOOD PRESSURE: 130 MMHG

## 2020-09-23 PROCEDURE — 11104 PUNCH BX SKIN SINGLE LESION: CPT

## 2020-09-23 NOTE — PROCEDURE
[FreeTextEntry1] : skin biopsy [FreeTextEntry2] : small fiber neuropathy [FreeTextEntry4] : Lidocaine 2% [FreeTextEntry3] : PRE-OP DIAGNOSIS: small fiber neuropathy\par POST-OP DIAGNOSIS: Same \par PROCEDURE: skin biopsy\par Performing Physician: Sarai Benavides MD, MPH\par \par PROCEDURE: \par _ Shave Biopsy _ Excisional Biopsy x_ [facility]\par  \par Informed consent was obtained from the patient. The area surrounding the skin lesion was prepared and draped in the usual sterile manner. The area was numbed with subcutaneous 2% Lidocaine.   The lesion was removed in the usual manner by the biopsy method noted above from the right distal leg and proximal thigh. Hemostasis was assured. The patient tolerated the procedure well. Skin biopsy sample to be send to [facility].\par \par Closure: _ Monsel’s for hemostasis _ suture _  _X None\par  \par Followup: The patient tolerated the procedure well without complications. Standard post-procedure care is explained and return precautions are given.\par

## 2020-09-23 NOTE — HISTORY OF PRESENT ILLNESS
[FreeTextEntry1] : istory of Present Illness\par The patient is here for numbness and tingling in bilateral arms and legs started summer 2019. There is no weakness in the arms or the legs. The patient denies any neck or back pain. The patient has family history of diabetic neuropathy.\par \par No difficulty with balance, walking or dizziness. \par The patient has not yet tried the Cymbalta.\par

## 2020-09-23 NOTE — ASSESSMENT
[FreeTextEntry1] : Numbness and tingling bilateral arms and legs with normal ncs/emg in pre-DM patient concerning for small fiber neuropathy\par will start Cymbalta 30mg daily. \par get skin biopsy for small fiber neuropathy\par patient advised on weight loss anf working with her PMD to manage pre-DM.

## 2020-09-24 ENCOUNTER — TRANSCRIPTION ENCOUNTER (OUTPATIENT)
Age: 30
End: 2020-09-24

## 2020-09-24 RX ORDER — DULOXETINE HYDROCHLORIDE 30 MG/1
30 CAPSULE, DELAYED RELEASE PELLETS ORAL
Qty: 90 | Refills: 0 | Status: ACTIVE | COMMUNITY
Start: 2020-09-18 | End: 1900-01-01

## 2020-10-07 ENCOUNTER — TRANSCRIPTION ENCOUNTER (OUTPATIENT)
Age: 30
End: 2020-10-07

## 2020-10-08 ENCOUNTER — LABORATORY RESULT (OUTPATIENT)
Age: 30
End: 2020-10-08

## 2020-10-08 ENCOUNTER — TRANSCRIPTION ENCOUNTER (OUTPATIENT)
Age: 30
End: 2020-10-08

## 2020-10-08 ENCOUNTER — APPOINTMENT (OUTPATIENT)
Dept: INTERNAL MEDICINE | Facility: CLINIC | Age: 30
End: 2020-10-08
Payer: COMMERCIAL

## 2020-10-08 VITALS
HEART RATE: 80 BPM | TEMPERATURE: 100.2 F | BODY MASS INDEX: 43.49 KG/M2 | OXYGEN SATURATION: 98 % | SYSTOLIC BLOOD PRESSURE: 133 MMHG | DIASTOLIC BLOOD PRESSURE: 81 MMHG | HEIGHT: 65 IN | WEIGHT: 261 LBS

## 2020-10-08 DIAGNOSIS — K80.20 CALCULUS OF GALLBLADDER W/OUT CHOLECYSTITIS W/OUT OBSTRUCTION: ICD-10-CM

## 2020-10-08 DIAGNOSIS — R79.89 OTHER SPECIFIED ABNORMAL FINDINGS OF BLOOD CHEMISTRY: ICD-10-CM

## 2020-10-08 DIAGNOSIS — E66.01 MORBID (SEVERE) OBESITY DUE TO EXCESS CALORIES: ICD-10-CM

## 2020-10-08 DIAGNOSIS — Z01.818 ENCOUNTER FOR OTHER PREPROCEDURAL EXAMINATION: ICD-10-CM

## 2020-10-08 LAB
BASOPHILS # BLD AUTO: 0.04 K/UL
BASOPHILS NFR BLD AUTO: 0.5 %
EOSINOPHIL # BLD AUTO: 0.14 K/UL
EOSINOPHIL NFR BLD AUTO: 1.6 %
HCT VFR BLD CALC: 41.2 %
HGB BLD-MCNC: 13.8 G/DL
IMM GRANULOCYTES NFR BLD AUTO: 0.1 %
LYMPHOCYTES # BLD AUTO: 3.59 K/UL
LYMPHOCYTES NFR BLD AUTO: 42.1 %
MAN DIFF?: NORMAL
MCHC RBC-ENTMCNC: 25 PG
MCHC RBC-ENTMCNC: 33.5 GM/DL
MCV RBC AUTO: 74.5 FL
MONOCYTES # BLD AUTO: 0.58 K/UL
MONOCYTES NFR BLD AUTO: 6.8 %
NEUTROPHILS # BLD AUTO: 4.16 K/UL
NEUTROPHILS NFR BLD AUTO: 48.9 %
PLATELET # BLD AUTO: 351 K/UL
RBC # BLD: 5.53 M/UL
RBC # FLD: 17.3 %
WBC # FLD AUTO: 8.52 K/UL

## 2020-10-08 PROCEDURE — 82270 OCCULT BLOOD FECES: CPT

## 2020-10-08 PROCEDURE — 99244 OFF/OP CNSLTJ NEW/EST MOD 40: CPT | Mod: 25

## 2020-10-08 NOTE — ASSESSMENT
[FreeTextEntry1] : 1gerd- discussed Rule of 2's; pt should avoid eating too much; too fast; too spicy; too lousy; less than two hours before bed \par -Things to avoid including overeating, spicy foods, tight clothing, eating within three hours of bed, this list is not all inclusive. \par -For treatment of reflux, possible options discussed including diet control, H2 blockers, PPIs, as well as coating motility agents discussed as treatment options. Timing of meals and proximity of last meal to sleep were discussed. If symptoms persist, a formal gastrointestinal evaluation is needed. \par \par Gastroesophageal reflux disease (GERD) occurs when stomach acid frequently flows back into the tube connecting your mouth and stomach (esophagus). This backwash (acid reflux) can irritate the lining of your esophagus.\par \par Many people experience acid reflux from time to time. GERD is mild acid reflux that occurs at least twice a week, or moderate to severe acid reflux that occurs at least once a week.\par \par \par Most people can manage the discomfort of GERD with lifestyle changes and over-the-counter medications. But some people with GERD may need stronger medications or surgery to ease symptoms.\par \par Symptoms\par \par Common signs and symptoms of GERD include:\par •A burning sensation in your chest (heartburn), usually after eating, which might be worse at night\par •Chest pain\par •Difficulty swallowing\par •Regurgitation of food or sour liquid\par •Sensation of a lump in your throat\par \par If you have nighttime acid reflux, you might also experience:\par •Chronic cough\par •Laryngitis\par •New or worsening asthma\par •Disrupted sleep\par \par When to see a doctor\par \par Seek immediate medical care if you have chest pain, especially if you also have shortness of breath, or jaw or arm pain. These may be signs and symptoms of a heart attack.\par \par Make an appointment with your doctor if you:\par •Experience severe or frequent GERD symptoms\par •Take over-the-counter medications for heartburn more than twice a week\par \par Causes\par \par GERD is caused by frequent acid reflux.\par \par When you swallow, a circular band of muscle around the bottom of your esophagus (lower esophageal sphincter) relaxes to allow food and liquid to flow into your stomach. Then the sphincter closes again.\par \par If the sphincter relaxes abnormally or weakens, stomach acid can flow back up into your esophagus. This constant backwash of acid irritates the lining of your esophagus, often causing it to become inflamed.\par \par Risk factors\par \par Conditions that can increase your risk of GERD include:\par •Obesity\par •Bulging of the top of the stomach up into the diaphragm (hiatal hernia)\par •Pregnancy\par •Connective tissue disorders, such as scleroderma\par •Delayed stomach emptying\par \par Factors that can aggravate acid reflux include:\par •Smoking\par •Eating large meals or eating late at night\par •Eating certain foods (triggers) such as fatty or fried foods\par •Drinking certain beverages, such as alcohol or coffee\par •Taking certain medications, such as aspirin\par \par Complications\par \par Over time, chronic inflammation in your esophagus can cause:\par •Narrowing of the esophagus (esophageal stricture). Damage to the lower esophagus from stomach acid causes scar tissue to form. The scar tissue narrows the food pathway, leading to problems with swallowing.\par •An open sore in the esophagus (esophageal ulcer). Stomach acid can wear away tissue in the esophagus, causing an open sore to form. An esophageal ulcer can bleed, cause pain and make swallowing difficult.\par •Precancerous changes to the esophagus (Neal's esophagus). Damage from acid can cause changes in the tissue lining the lower esophagus. These changes are associated with an increased risk of esophageal cancer\par she will have egd for  evaluation of chronic gerd and ulcers  Upper endoscopy is a procedure that lets a doctor look at the lining of the upper digestive tract. The upper digestive tract includes the esophagus (the tube than connects the mouth to the stomach), the stomach, and the duodenum (the first part of the small intestine).\par \par \par Why might my doctor do an upper endoscopy?\par You might have an upper endoscopy if you have:\par \par ?Pain in your upper belly that you cannot explain\par \par ?A condition called "acid reflux"\par \par ?Nausea and vomiting that has lasted a long time\par \par ?Diarrhea that has lasted a long time\par \par ?Black bowel movements or blood in your vomit\par \par ?Trouble swallowing or a feeling of food getting stuck in your throat\par \par ?Abnormal results from other tests of your digestive system\par \par ?Swallowed an object that you should not have swallowed\par \par ?Had growths or ulcers in your digestive tract, and your doctor wants to follow up\par \par \par \par What should I do before an upper endoscopy?\par Your doctor will give you instructions about what to do before an upper endoscopy. He or she will tell you if you need to stop eating or drinking, or stop taking any of your usual medicines beforehand. Make sure to read the instructions as soon as you get them. You might have to stop some medicines up to a week before the test. Let your doctor know if you have trouble getting ready for your upper endoscopy.\par \par \par What happens during an upper endoscopy?\par Your doctor will give you an IV, a thin tube that goes into a vein. You will get medicines through the IV to make you feel relaxed. He or she might give you a mouth spray or gargle to numb your mouth. You will also get a plastic mouth guard to protect your teeth.\par \par Then your doctor will put a thin tube with a camera and light on the end into your mouth and down into your esophagus, stomach, and duodenum. He or she will look for irritation, bleeding, ulcers, or growths.\par \par During an upper endoscopy, your doctor might also:\par \par ?Do a test called a biopsy – During a biopsy, a doctor takes a small piece of tissue from the lining of the digestive tract. (You will not feel this.) Then he or she looks at the tissue under a microscope.\par \par \par ?Treat problems that he or she sees – For example, a doctor can stop bleeding or sometimes remove a growth. He or she can also widen any narrow areas of the esophagus. Narrow areas of the esophagus can cause trouble swallowing.\par \par \par \par What happens after an upper endoscopy?\par After an upper endoscopy, you will be watched for 1 to 2 hours until the medicines wear off. Most doctors recommend that people not drive or go to work right after an upper endoscopy. Most can drive and go back to work the next day.\par \par \par What are the side effects of an upper endoscopy?\par The most common side effect is feeling bloated. Some people have nausea because of the medicines used before the procedure. If this happens to you, your doctor can give you medicine to make the nausea better. Most people can eat as usual after the procedure.\par \par Other side effects are not as common, but can occur. These can include:\par \par ?Food from the stomach getting into the lungs\par \par ?Bleeding, for example, after a growth is removed\par \par ?Getting a tear in the digestive tract lining\par \par ?Having redness or swelling of the skin around the IV\par \par \par \par Should I call my doctor or nurse?\par Call your doctor or nurse immediately if you have any of the following problems after your upper endoscopy:\par \par ?Belly pain that is much worse than gas pain or cramps\par \par ?A bloated and hard belly\par \par ?Vomiting\par \par ?Fever\par \par ?Trouble swallowing or severe throat pain\par \par ?Black bowel movements\par \par ?A "crunching" feeling under the skin in the neck\par 2 bmi 43 -  risks of obesity and need for diet and eating healthy Obesity is a complex disorder involving an excessive amount of body fat. Obesity isn't just a cosmetic concern. It increases your risk of diseases and health problems, such as heart disease, diabetes and high blood pressure.\par \par Being extremely obese means you are especially likely to have health problems related to your weight.\par \par \par The good news is that even modest weight loss can improve or prevent the health problems associated with obesity. Dietary changes, increased physical activity and behavior changes can help you lose weight. Prescription medications and weight-loss surgery are additional options for treating obesity.\par \par \par \par \par Symptoms\par \par Obesity is diagnosed when your body mass index (BMI) is 30 or higher. Your body mass index is calculated by dividing your weight in kilograms (kg) by your height in meters (m) squared. \par \par \par BMI\par \par Weight status\par \par \par Below 18.5 Underweight \par 18.5-24.9 Normal \par 25.0-29.9 Overweight \par 30.0-34.9 Obese (Class I) \par 35.0-39.9 Obese (Class II) \par 40.0 and higher Extreme obesity (Class III) \par \par For most people, BMI provides a reasonable estimate of body fat. However, BMI doesn't directly measure body fat, so some people, such as muscular athletes, may have a BMI in the obese category even though they don't have excess body fat. Ask your doctor if your BMI is a problem. \par \par When to see a doctor\par \par If you think you may be obese, and especially if you're concerned about weight-related health problems, see your doctor or health care provider. You and your provider can evaluate your health risks and discuss your weight-loss options. \par \par Request an Appointment at [facility]\par \par Causes\par \par Although there are genetic, behavioral and hormonal influences on body weight, obesity occurs when you take in more calories than you burn through exercise and normal daily activities. Your body stores these excess calories as fat.\par \par Obesity can sometimes be traced to a medical cause, such as Prader-Willi syndrome, Cushing's syndrome, and other diseases and conditions. However, these disorders are rare and, in general, the principal causes of obesity are:\par •Inactivity. If you're not very active, you don't burn as many calories. With a sedentary lifestyle, you can easily take in more calories every day than you use through exercise and normal daily activities.\par •Unhealthy diet and eating habits. Weight gain is inevitable if you regularly eat more calories than you burn. And most Americans' diets are too high in calories and are full of fast food and high-calorie beverages.\par \par Risk factors\par \par Obesity usually results from a combination of causes and contributing factors, including:\par •Genetics. Your genes may affect the amount of body fat you store, and where that fat is distributed. Genetics may also play a role in how efficiently your body converts food into energy and how your body burns calories during exercise.\par •Family lifestyle. Obesity tends to run in families. If one or both of your parents are obese, your risk of being obese is increased. That's not just because of genetics. Family members tend to share similar eating and activity habits.\par •Inactivity. If you're not very active, you don't burn as many calories. With a sedentary lifestyle, you can easily take in more calories every day than you burn through exercise and routine daily activities. Having medical problems, such as arthritis, can lead to decreased activity, which contributes to weight gain.\par •Unhealthy diet. A diet that's high in calories, lacking in fruits and vegetables, full of fast food, and laden with high-calorie beverages and oversized portions contributes to weight gain.\par •Medical problems. In some people, obesity can be traced to a medical cause, such as Prader-Willi syndrome, Cushing's syndrome and other conditions. Medical problems, such as arthritis, also can lead to decreased activity, which may result in weight gain.\par •Certain medications. Some medications can lead to weight gain if you don't compensate through diet or activity. These medications include some antidepressants, anti-seizure medications, diabetes medications, antipsychotic medications, steroids and beta blockers.\par •Social and economic issues. Research has linked social and economic factors to obesity. Avoiding obesity is difficult if you don't have safe areas to exercise. Similarly, you may not have been taught healthy ways of cooking, or you may not have money to buy healthier foods. In addition, the people you spend time with may influence your weight — you're more likely to become obese if you have obese friends or relatives.\par •Age. Obesity can occur at any age, even in young children. But as you age, hormonal changes and a less active lifestyle increase your risk of obesity. In addition, the amount of muscle in your body tends to decrease with age. This lower muscle mass leads to a decrease in metabolism. These changes also reduce calorie needs, and can make it harder to keep off excess weight. If you don't consciously control what you eat and become more physically active as you age, you'll likely gain weight.\par •Pregnancy. During pregnancy, a woman's weight necessarily increases. Some women find this weight difficult to lose after the baby is born. This weight gain may contribute to the development of obesity in women.\par •Quitting smoking. Quitting smoking is often associated with weight gain. And for some, it can lead to enough weight gain that the person becomes obese. In the long run, however, quitting smoking is still a greater benefit to your health than continuing to smoke.\par •Lack of sleep. Not getting enough sleep or getting too much sleep can cause changes in hormones that increase your appetite. You may also crave foods high in calories and carbohydrates, which can contribute to weight gain.\par \par Even if you have one or more of these risk factors, it doesn't mean that you're destined to become obese. You can counteract most risk factors through diet, physical activity and exercise, and behavior changes.\par \par Complications\par \par If you're obese, you're more likely to develop a number of potentially serious health problems, including:\par •High triglycerides and low high-density lipoprotein (HDL) cholesterol\par •Type 2 diabetes\par •High blood pressure\par •Metabolic syndrome — a combination of high blood sugar, high blood pressure, high triglycerides and low HDL cholesterol\par •Heart disease\par •Stroke\par •Cancer, including cancer of the uterus, cervix, endometrium, ovaries, breast, colon, rectum, esophagus, liver, gallbladder, pancreas, kidney and prostate\par •Breathing disorders, including sleep apnea, a potentially serious sleep disorder in which breathing repeatedly stops and starts\par •Gallbladder disease\par •Gynecological problems, such as infertility and irregular periods\par •Erectile dysfunction and sexual health issues\par •Nonalcoholic fatty liver disease, a condition in which fat builds up in the liver and can cause inflammation or scarring\par •Osteoarthritis\par \par Quality of life\par \par When you're obese, your overall quality of life may be diminished. You may not be able to do things you used to do, such as participating in enjoyable activities. You may avoid public places. Obese people may even encounter discrimination.\par \par Other weight-related issues that may affect your quality of life include:\par •Depression\par •Disability\par •Sexual problems\par •Shame and guilt\par •Social isolation\par •Lower work achievement\par \par Prevention\par \par Whether you're at risk of becoming obese, currently overweight or at a healthy weight, you can take steps to prevent unhealthy weight gain and related health problems. Not surprisingly, the steps to prevent weight gain are the same as the steps to lose weight: daily exercise, a healthy diet, and a long-term commitment to watch what you eat and drink.\par •Exercise regularly. You need to get 150 to 300 minutes of moderate-intensity activity a week to prevent weight gain. Moderately intense physical activities include fast walking and swimming.\par •Follow a healthy eating plan. Focus on low-calorie, nutrient-dense foods, such as fruits, veg\par pt should eat 60-70  gms of protein a day or 20-30 gms per meal This is fish chicken eggs lean meat such as chicken turkey pork and beef .  - Pt should not eat more than a 200 calorie snack  and make sure they are protein and fiber rich. she should eat 7 serving of protein, 12 servings of carbohydrates and 3 servings of non starchy vegetables and 4 servings of fat Dont eat unless you are physically hungry and never eat in front of a TV go to the kitchen table.  she should not eat more than a 1500 calories per day.\par 3 sleep apnea is associated with adverse clinical consequences which an affect most organ systems. Cardiovascular disease risk includes arrhythmias, atrial fibrillation, hypertension, coronary artery disease, and stroke. Metabolic disorders include diabetes type 2, non-alcoholic fatty liver disease. Mood disorder especially depression; and cognitive decline especially in the elderly. Associations with chronic reflux/Neal’s esophagus some but not all inclusive. \par -Reasons include arousal consistent with hypopnea; respiratory events most prominent in REM sleep or supine position; therefore sleep staging and body position are important for accurate diagnosis and estimation of AHI. \par \par She is having final testing On oct 19\par

## 2020-10-08 NOTE — CONSULT LETTER
[Dear  ___] : Dear  [unfilled], [Consult Letter:] : I had the pleasure of evaluating your patient, [unfilled]. [( Thank you for referring [unfilled] for consultation for _____ )] : Thank you for referring [unfilled] for consultation for [unfilled] [Please see my note below.] : Please see my note below. [Consult Closing:] : Thank you very much for allowing me to participate in the care of this patient.  If you have any questions, please do not hesitate to contact me. [Sincerely,] : Sincerely, [FreeTextEntry2] : Dr Lizarraga  [FreeTextEntry3] : Faizan Moore MDFACP

## 2020-10-08 NOTE — PHYSICAL EXAM
[Well Developed] : well developed [Well Nourished] : well nourished [Conjunctiva] : the conjunctiva were normal in both eyes [PERRL] : pupils were equal in size, round, and reactive to light [EOM Intact] : extraocular movements were intact [Normal Right Eye] : Right eye: normal [Normal Left Eye] : Left eye: normal [Normal Right Ear] : Right ear: the external ear, canal and tympanic membrane were normal [Normal Left Ear] : Left ear: the external ear, canal and tympanic membrane were normal [Normal Nose] : Nose: the external nose, nasal mucosa, and septum were normal [Normal Nasopharynx] : Nasopharynx: the nasal turbinates, mucosa, adenoids, eustachian tubes were normal [Normal Lips/Teeth/Gums] : Lips, teeth and gums were normal [Normal Oropharynx] : Oropharynx: the oral mucosa, hard and soft palates, tongue, tonsils, and posterior pharynx were normal [Normal Larynx] : Larynx: the epiglottis and vocal cords were normal [Normal Neck] : Neck: Supple, no masses or thyromegaly [Neck Appearance] : the appearance of the neck was normal [Neck Cervical Mass (___cm)] : no neck mass was observed [Jugular Venous Distention Increased] : there was no jugular-venous distention [Thyroid Diffuse Enlargement] : the thyroid was not enlarged [Thyroid Nodule] : there were no palpable thyroid nodules [Rate ___] : at [unfilled] breaths per minute [Normal Rhythm/Effort] : normal respiratory rhythm and effort [Clear Bilaterally] : the lungs were clear to auscultation bilaterally [Normal to Percussion] : the lungs were normal to percussion [5th Left ICS - MCL] : palpated at the 5th LICS in the midclavicular line [Normal Rate] : normal [Heart Rate ___] : [unfilled] bpm [Rhythm Regular] : regular [Normal S1] : normal S1 [Normal S2] : normal S2 [S3] : no S3 [S4] : no S4 [No Murmur] : no murmurs heard [No Pitting Edema] : no pitting edema present [Right Carotid Bruit] : no bruit heard over the right carotid [Left Carotid Bruit] : no bruit heard over the left carotid [Right Femoral Bruit] : no bruit heard over the right femoral artery [Left Femoral Bruit] : no bruit heard over the left femoral artery [2+] : left 2+ [No Abnormalities] : the abdominal aorta was not enlarged and no bruit was heard [Examination Of The Breasts] : a normal appearance [Obese] : obese [Soft, Nontender] : the abdomen was soft and nontender [No Mass] : no masses were palpated [None] : no CVA tenderness [Normal rectal exam] : was normal [Occult Blood Positive] : was negative for occult blood [Gross Blood] : no gross blood [Stool Sample Taken] : a stool sample was obtained [Fecal Impaction] : no fecal impaction was present [Cervical Lymph Nodes Enlarged Posterior Bilaterally] : nodes not enlarged [Supraclavicular Lymph Nodes Enlarged Bilaterally] : nodes not enlarged [Axillary Lymph Nodes Enlarged Bilaterally] : nodes not enlarged [Inguinal Lymph Nodes Enlarged Bilaterally] : nodes not enlarged [Normal Kyphosis] : normal kyphosis [No Visual Abnormalities] : no visible abnormalities [Normal Lordosis] : normal lordosis [No Scoliosis] : no scoliosis [No Tenderness to Palpation] : no spine tenderness on palpation [No Masses] : no masses [Full ROM] : full ROM [No Pain with ROM] : no pain with motion in any direction [Intact] : all reflexes within normal limits bilaterally [Normal Station and Gait] : the gait and station were normal [Normal Motor Tone] : the muscle tone was normal [Involuntary Movements] : no involuntary movements were seen [Normal Scalp] : inspection of the scalp showed no abnormalities [Examination Of The Hair] : texture and distribution of hair was normal [Abnormal Color] : normal color and pigmentation [Complexion Dark] : dark complexion [Skin Lesions 1] : no skin lesions were observed [Both Nipples Pierced For Ornamentation] : both nipples [Both Ears Pierced] : both ears [Nose Pierced For Ornamentation Both Alae] : both nasal alae [Multiple Tattoos] : multiple tattoos observed [Skin Turgor Decreased] : normal skin turgor [Normal] : normal texture and mobility [FreeTextEntry1] : tongue [Deep Tendon Reflexes (DTR)] : deep tendon reflexes were 2+ and symmetric [Sensation] : the sensory exam was normal to light touch and pinprick [No Focal Deficits] : no focal deficits [Oriented To Time, Place, And Person] : oriented to person, place, and time [Impaired Insight] : insight and judgment were intact [Affect] : the affect was normal

## 2020-10-08 NOTE — OB HISTORY
[Definite:  ___ (Date)] : the last menstrual period was [unfilled] [Regular Cycle Intervals] : periods have been regular [Menarche Age: ____] : age at menarche was [unfilled] [___] :  Induced: [unfilled]

## 2020-10-08 NOTE — HISTORY OF PRESENT ILLNESS
[Heartburn] : stable heartburn [Nausea] : denies nausea [Vomiting] : denies vomiting [Diarrhea] : denies diarrhea [Constipation] : denies constipation [Yellow Skin Or Eyes (Jaundice)] : denies jaundice [Wt Gain ___ Lbs] : no recent weight gain [Wt Loss ___ Lbs] : recent [unfilled] ~Upound(s) weight loss [Abdominal Pain] : abdominal pain [Abdominal Swelling] : abdominal swelling [GERD] : gastroesophageal reflux disease [Hiatus Hernia] : no hiatus hernia [Peptic Ulcer Disease] : peptic ulcer disease [Pancreatitis] : no pancreatitis [Cholelithiasis] : no cholelithiasis [Kidney Stone] : no kidney stone [Inflammatory Bowel Disease] : no inflammatory bowel disease [Irritable Bowel Syndrome] : no irritable bowel syndrome [Diverticulitis] : no diverticulitis [Alcohol Abuse] : no alcohol abuse [Malignancy] : no malignancy [Abdominal Surgery] : no abdominal surgery [Appendectomy] : no appendectomy [Cholecystectomy] : no cholecystectomy [Good Compliance] : good compliance with treatment [de-identified] : Pt is a 30 yr old woman who has a hx of gastric ulcer in 2016 and had egd and bx.  she is on omeprazole  daily l Pt states she has abd pain  , aching and similar to ulcer pain.  she is on omeprazole since 2016. She doesn’t have reflux of acid  unless she eats certain foods.   She has pain with salsa , coffee, and pizza.  No dysphagia, no black stools, no rectal bleeding or bloating.  she has not been told to have hpylori.  She  had a dvt in Oct 2016 and told to take aspirin and in her left .  She saw a hematologist and had evaluation for  reason for clots.  She is a  and sits for long hours.  She doesn’t drink alcohol or take nsaids except her aspirin.

## 2020-10-09 ENCOUNTER — TRANSCRIPTION ENCOUNTER (OUTPATIENT)
Age: 30
End: 2020-10-09

## 2020-10-09 LAB
25(OH)D3 SERPL-MCNC: 33.3 NG/ML
ANION GAP SERPL CALC-SCNC: 12 MMOL/L
APPEARANCE: CLEAR
BILIRUBIN URINE: NEGATIVE
BLOOD URINE: NEGATIVE
BUN SERPL-MCNC: 13 MG/DL
CALCIUM SERPL-MCNC: 9.7 MG/DL
CHLORIDE SERPL-SCNC: 102 MMOL/L
CO2 SERPL-SCNC: 27 MMOL/L
COLOR: NORMAL
CREAT SERPL-MCNC: 0.8 MG/DL
GLUCOSE QUALITATIVE U: NEGATIVE
GLUCOSE SERPL-MCNC: 93 MG/DL
IGA SER QL IEP: 234 MG/DL
KETONES URINE: NEGATIVE
LEUKOCYTE ESTERASE URINE: NEGATIVE
MAGNESIUM SERPL-MCNC: 1.9 MG/DL
NITRITE URINE: NEGATIVE
PH URINE: 6.5
POTASSIUM SERPL-SCNC: 4.3 MMOL/L
PROTEIN URINE: NORMAL
SODIUM SERPL-SCNC: 141 MMOL/L
SPECIFIC GRAVITY URINE: 1.02
TTG IGA SER IA-ACNC: <1.2 U/ML
TTG IGA SER-ACNC: NEGATIVE
TTG IGG SER IA-ACNC: 1.4 U/ML
TTG IGG SER IA-ACNC: NEGATIVE
UROBILINOGEN URINE: NORMAL

## 2020-10-09 RX ORDER — TRETINOIN 0.25 MG/G
0.03 CREAM TOPICAL
Qty: 1 | Refills: 4 | Status: ACTIVE | COMMUNITY
Start: 2020-09-10 | End: 1900-01-01

## 2020-10-13 LAB
HGB A MFR BLD: 60.9 %
HGB A2 MFR BLD: 3.4 %
HGB C MFR BLD: 35.7 %
HGB FRACT BLD-IMP: NORMAL
HGB S BLD QL SOLY: NEGATIVE

## 2020-10-16 LAB
BARLEY IGE QN: <0.1 KUA/L
CHERRY IGE QN: <0.1 KUA/L
COW MILK IGE QN: <0.1 KUA/L
CRAB IGE QN: <0.1 KUA/L
DEPRECATED BARLEY IGE RAST QL: 0
DEPRECATED CHERRY IGE RAST QL: 0
DEPRECATED COW MILK IGE RAST QL: 0
DEPRECATED CRAB IGE RAST QL: 0
DEPRECATED EGG WHITE IGE RAST QL: 0
DEPRECATED OAT IGE RAST QL: 0
DEPRECATED PEANUT IGE RAST QL: 0
DEPRECATED RYE IGE RAST QL: 0
DEPRECATED SOYBEAN IGE RAST QL: 0
DEPRECATED WHEAT IGE RAST QL: 0
EGG WHITE IGE QN: <0.1 KUA/L
OAT IGE QN: <0.1 KUA/L
PEANUT IGE QN: <0.1 KUA/L
RYE IGE QN: <0.1 KUA/L
SOYBEAN IGE QN: <0.1 KUA/L
TOTAL IGE SMQN RAST: 10 KU/L
WHEAT IGE QN: <0.1 KUA/L

## 2020-10-29 ENCOUNTER — APPOINTMENT (OUTPATIENT)
Dept: INTERNAL MEDICINE | Facility: CLINIC | Age: 30
End: 2020-10-29
Payer: COMMERCIAL

## 2020-10-29 ENCOUNTER — APPOINTMENT (OUTPATIENT)
Dept: NEUROLOGY | Facility: CLINIC | Age: 30
End: 2020-10-29
Payer: COMMERCIAL

## 2020-10-29 VITALS
BODY MASS INDEX: 44.15 KG/M2 | SYSTOLIC BLOOD PRESSURE: 128 MMHG | OXYGEN SATURATION: 98 % | HEART RATE: 80 BPM | DIASTOLIC BLOOD PRESSURE: 84 MMHG | HEIGHT: 65 IN | TEMPERATURE: 97.3 F | WEIGHT: 265 LBS

## 2020-10-29 VITALS
HEIGHT: 65 IN | SYSTOLIC BLOOD PRESSURE: 128 MMHG | HEART RATE: 80 BPM | OXYGEN SATURATION: 98 % | WEIGHT: 265 LBS | DIASTOLIC BLOOD PRESSURE: 84 MMHG | TEMPERATURE: 97.3 F | BODY MASS INDEX: 44.15 KG/M2

## 2020-10-29 DIAGNOSIS — I49.8 OTHER SPECIFIED CARDIAC ARRHYTHMIAS: ICD-10-CM

## 2020-10-29 DIAGNOSIS — R20.2 ANESTHESIA OF SKIN: ICD-10-CM

## 2020-10-29 DIAGNOSIS — K21.9 GASTRO-ESOPHAGEAL REFLUX DISEASE W/OUT ESOPHAGITIS: ICD-10-CM

## 2020-10-29 DIAGNOSIS — I82.462 LT CALF MUSCULAR VEIN ACUTE EMBOLISM AND THROMBOSIS: ICD-10-CM

## 2020-10-29 DIAGNOSIS — Z79.899 OTHER LONG TERM (CURRENT) DRUG THERAPY: ICD-10-CM

## 2020-10-29 DIAGNOSIS — G62.9 POLYNEUROPATHY, UNSPECIFIED: ICD-10-CM

## 2020-10-29 DIAGNOSIS — K25.7 CHRONIC GASTRIC ULCER W/OUT HEMORRHAGE OR PERFORATION: ICD-10-CM

## 2020-10-29 DIAGNOSIS — R20.0 ANESTHESIA OF SKIN: ICD-10-CM

## 2020-10-29 DIAGNOSIS — D56.3 THALASSEMIA MINOR: ICD-10-CM

## 2020-10-29 DIAGNOSIS — Z01.818 ENCOUNTER FOR OTHER PREPROCEDURAL EXAMINATION: ICD-10-CM

## 2020-10-29 DIAGNOSIS — D58.2 OTHER HEMOGLOBINOPATHIES: ICD-10-CM

## 2020-10-29 DIAGNOSIS — G47.30 SLEEP APNEA, UNSPECIFIED: ICD-10-CM

## 2020-10-29 LAB
APPEARANCE: CLEAR
BILIRUBIN URINE: NEGATIVE
BLOOD URINE: NEGATIVE
COLOR: NORMAL
GLUCOSE QUALITATIVE U: NEGATIVE
KETONES URINE: NEGATIVE
LEUKOCYTE ESTERASE URINE: NEGATIVE
NITRITE URINE: NEGATIVE
PH URINE: 6.5
PROTEIN URINE: NEGATIVE
SPECIFIC GRAVITY URINE: 1.01
UROBILINOGEN URINE: NORMAL

## 2020-10-29 PROCEDURE — 93000 ELECTROCARDIOGRAM COMPLETE: CPT

## 2020-10-29 PROCEDURE — 99214 OFFICE O/P EST MOD 30 MIN: CPT

## 2020-10-29 PROCEDURE — 99215 OFFICE O/P EST HI 40 MIN: CPT | Mod: 25

## 2020-10-29 PROCEDURE — 99072 ADDL SUPL MATRL&STAF TM PHE: CPT

## 2020-10-29 NOTE — DATA REVIEWED
[de-identified] : gi note appreciated\par bmp noted\par skin biopsy noted for small fiber neuropathy

## 2020-10-29 NOTE — ASSESSMENT
[FreeTextEntry1] : Numbness and tingling bilateral arms and legs with normal ncs/emg and skin biopsy cw small fiber neuropathy in pre-DM \par encourgaed patient to start Cymbalta 30mg daily for symptomatic relief\par patient advised on weight loss and working with her PMD to manage pre-DM. \par

## 2020-10-29 NOTE — PHYSICAL EXAM
[General Appearance - Alert] : alert [General Appearance - In No Acute Distress] : in no acute distress [Person] : oriented to person [Place] : oriented to place [Time] : oriented to time [Concentration Intact] : normal concentrating ability [Naming Objects] : no difficulty naming common objects [Repeating Phrases] : no difficulty repeating a phrase [Fluency] : fluency intact [Comprehension] : comprehension intact [Vocabulary] : adequate range of vocabulary [Abnormal Walk] : normal gait [Balance] : balance was intact

## 2020-10-29 NOTE — RESULTS/DATA
[] : results reviewed [Ventricular Rate___] : ventricular rate is [unfilled] beats per minute [Sinus Arrhythmia] : sinus arrhythmia [P Waves Normal] : the P wave is normal [ECG Intervals DC.] : DC interval is normal [UT Interval___] : [unfilled] seconds [Normal QRS] : the QRS is normal [QRS Interval___] : QRS interval: [unfilled] seconds [ECG Axis] : the QRS axis is normal [QTc Interval___] : QTc interval: [unfilled] [Normal ST Segments] : the ST segments are normal [ECG T. Waves] : normal

## 2020-10-30 LAB — UREA BREATH TEST QL: NEGATIVE

## 2020-11-03 ENCOUNTER — APPOINTMENT (OUTPATIENT)
Dept: DISASTER EMERGENCY | Facility: CLINIC | Age: 30
End: 2020-11-03

## 2020-11-04 LAB — SARS-COV-2 N GENE NPH QL NAA+PROBE: NOT DETECTED

## 2020-11-04 NOTE — HISTORY OF PRESENT ILLNESS
[No Pertinent Cardiac History] : no history of aortic stenosis, atrial fibrillation, coronary artery disease, recent myocardial infarction, or implantable device/pacemaker [Sleep Apnea] : sleep apnea [(Patient denies any chest pain, claudication, dyspnea on exertion, orthopnea, palpitations or syncope)] : Patient denies any chest pain, claudication, dyspnea on exertion, orthopnea, palpitations or syncope [Chronic Anticoagulation] : no chronic anticoagulation [Chronic Kidney Disease] : no chronic kidney disease [Diabetes] : no diabetes [FreeTextEntry1] : egd  [FreeTextEntry2] : 11/6/20  [FreeTextEntry3] : Ramon  [FreeTextEntry4] : Pt is a 30 yr old woman with hx of gastric ulcer  in 2016 She has reflux with foods  and pain and no dysphagia or black stools . Seh ias been on long term ppi .  she is here for clearance for  egd. She has hx of dvt  2016 .

## 2020-11-04 NOTE — PHYSICAL EXAM
[Well Developed] : well developed [Well Nourished] : well nourished [Normal Voice Quality] : was normal [Normal Verbal Skills] : the patient had normal verbal communication skills [Normal Nonverbal Skills] : normal nonverbal communication skills were demonstrated [Conjunctiva] : the conjunctiva were normal in both eyes [PERRL] : pupils were equal in size, round, and reactive to light [EOM Intact] : extraocular movements were intact [Rate ___] : at [unfilled] breaths per minute [Normal Rhythm/Effort] : normal respiratory rhythm and effort [Clear Bilaterally] : the lungs were clear to auscultation bilaterally [Normal to Percussion] : the lungs were normal to percussion [Heart Rate ___] : [unfilled] bpm [Normal Rate] : normal [Normal S1] : normal S1 [Normal S2] : normal S2 [No Murmur] : no murmurs heard [No Pitting Edema] : no pitting edema present [2+] : left 2+ [No Abnormalities] : the abdominal aorta was not enlarged and no bruit was heard [Soft, Nontender] : the abdomen was soft and nontender [No Mass] : no masses were palpated [No HSM] : no hepatosplenomegaly noted [Normal Station and Gait] : the gait and station were normal [Normal Motor Tone] : the muscle tone was normal [Involuntary Movements] : no involuntary movements were seen [Normal Scalp] : inspection of the scalp showed no abnormalities [Examination Of The Hair] : texture and distribution of hair was normal [Normal] : affect was normal and insight and judgment were intact [Bruit] : no bruit heard [S3] : no S3 [S4] : no S4 [Rt] : no varicose veins of the right leg [Lt] : no varicose veins of the left leg [Right Carotid Bruit] : no bruit heard over the right carotid [Left Carotid Bruit] : no bruit heard over the left carotid [Right Femoral Bruit] : no bruit heard over the right femoral artery [Left Femoral Bruit] : no bruit heard over the left femoral artery

## 2020-11-04 NOTE — ASSESSMENT
[High Risk Surgery - Intraperitoneal, Intrathoracic or Supringuinal Vascular Procedures] : High Risk Surgery - Intraperitoneal, Intrathoracic or Supringuinal Vascular Procedures - No (0) [Ischemic Heart Disease] : Ischemic Heart Disease - No (0) [Congestive Heart Failure] : Congestive Heart Failure - No (0) [Prior Cerebrovascular Accident or TIA] : Prior Cerebrovascular Accident or TIA - No (0) [Creatinine >= 2mg/dL (1 Point)] : Creatinine >= 2mg/dL - No (0) [Insulin-dependent Diabetic (1 Point)] : Insulin-dependent Diabetic - No (0) [0] : 0 , RCRI Class: I, Risk of Post-Op Cardiac Complications: 3.9%, 95% CI for Risk Estimate: 2.8% - 5.4% [As per surgery] : as per surgery [FreeTextEntry4] : Pt is having a low risk procedure and is low risks for cardiac adverse outcome and cri is 0.4% .  she was explained the procedures egd and  anesthesia   risks and complications alternatives , prep and post procedure care.  I have answered all her questions.  she is medically cleared if hcg is negative and covid  is negative \par Upper endoscopy is a procedure that lets a doctor look at the lining of the upper digestive tract. The upper digestive tract includes the esophagus (the tube than connects the mouth to the stomach), the stomach, and the duodenum (the first part of the small intestine).\par \par \par Why might my doctor do an upper endoscopy?\par You might have an upper endoscopy if you have:\par \par ?Pain in your upper belly that you cannot explain\par \par ?A condition called "acid reflux"\par \par ?Nausea and vomiting that has lasted a long time\par \par ?Diarrhea that has lasted a long time\par \par ?Black bowel movements or blood in your vomit\par \par ?Trouble swallowing or a feeling of food getting stuck in your throat\par \par ?Abnormal results from other tests of your digestive system\par \par ?Swallowed an object that you should not have swallowed\par \par ?Had growths or ulcers in your digestive tract, and your doctor wants to follow up\par \par \par \par What should I do before an upper endoscopy?\par Your doctor will give you instructions about what to do before an upper endoscopy. He or she will tell you if you need to stop eating or drinking, or stop taking any of your usual medicines beforehand. Make sure to read the instructions as soon as you get them. You might have to stop some medicines up to a week before the test. Let your doctor know if you have trouble getting ready for your upper endoscopy.\par \par \par What happens during an upper endoscopy?\par Your doctor will give you an IV, a thin tube that goes into a vein. You will get medicines through the IV to make you feel relaxed. He or she might give you a mouth spray or gargle to numb your mouth. You will also get a plastic mouth guard to protect your teeth.\par \par Then your doctor will put a thin tube with a camera and light on the end into your mouth and down into your esophagus, stomach, and duodenum. He or she will look for irritation, bleeding, ulcers, or growths.\par \par During an upper endoscopy, your doctor might also:\par \par ?Do a test called a biopsy – During a biopsy, a doctor takes a small piece of tissue from the lining of the digestive tract. (You will not feel this.) Then he or she looks at the tissue under a microscope.\par \par \par ?Treat problems that he or she sees – For example, a doctor can stop bleeding or sometimes remove a growth. He or she can also widen any narrow areas of the esophagus. Narrow areas of the esophagus can cause trouble swallowing.\par \par \par \par What happens after an upper endoscopy?\par After an upper endoscopy, you will be watched for 1 to 2 hours until the medicines wear off. Most doctors recommend that people not drive or go to work right after an upper endoscopy. Most can drive and go back to work the next day.\par \par \par What are the side effects of an upper endoscopy?\par The most common side effect is feeling bloated. Some people have nausea because of the medicines used before the procedure. If this happens to you, your doctor can give you medicine to make the nausea better. Most people can eat as usual after the procedure.\par \par Other side effects are not as common, but can occur. These can include:\par \par ?Food from the stomach getting into the lungs\par \par ?Bleeding, for example, after a growth is removed\par \par ?Getting a tear in the digestive tract lining\par \par ?Having redness or swelling of the skin around the IV\par \par \par \par Should I call my doctor or nurse?\par Call your doctor or nurse immediately if you have any of the following problems after your upper endoscopy:\par \par ?Belly pain that is much worse than gas pain or cramps\par \par ?A bloated and hard belly\par \par ?Vomiting\par \par ?Fever\par \par ?Trouble swallowing or severe throat pain\par \par ?Black bowel movements\par \par ?A "crunching" feeling under the skin in the neck\par hand out given     [FreeTextEntry7] : none

## 2020-11-06 ENCOUNTER — RESULT REVIEW (OUTPATIENT)
Age: 30
End: 2020-11-06

## 2020-11-06 ENCOUNTER — OUTPATIENT (OUTPATIENT)
Dept: OUTPATIENT SERVICES | Facility: HOSPITAL | Age: 30
LOS: 1 days | End: 2020-11-06
Payer: COMMERCIAL

## 2020-11-06 ENCOUNTER — APPOINTMENT (OUTPATIENT)
Dept: INTERNAL MEDICINE | Facility: HOSPITAL | Age: 30
End: 2020-11-06

## 2020-11-06 DIAGNOSIS — K25.7 CHRONIC GASTRIC ULCER WITHOUT HEMORRHAGE OR PERFORATION: ICD-10-CM

## 2020-11-06 PROCEDURE — 43239 EGD BIOPSY SINGLE/MULTIPLE: CPT

## 2020-11-06 PROCEDURE — 88305 TISSUE EXAM BY PATHOLOGIST: CPT | Mod: 26

## 2020-11-06 PROCEDURE — 88312 SPECIAL STAINS GROUP 1: CPT | Mod: 26

## 2020-11-06 NOTE — CHART NOTE - NSCHARTNOTEFT_GEN_A_CORE
Esophagogastroduodenoscopy Report      Indication: chronic gerd , hx of gastric ulcer  Referring MD: Dr. Navarro  Anesthesia: MAC  Consent:  Informed consent was obtained from the patient after providing any opportunity for questions  Procedure: The gastroscope was gently passed through the incisoral orifice into the oral cavity and under direct visualization the esophagus was intubated. The endoscope was passed down the esophagus, through the stomach and into proximal jejunum. Color, texture, mucosa and anatomy of the esophagus, stomach, and duodenum were carefully examined with the scope. The patient tolerated the procedure well. After completion of the examination, the patient was transferred to the recovery room.   Preparation: NPO   Findings:     Oropharynx	Normal    Esophagus	Normal.  bx of distal esophagus # 3    EG-junction	Normal Z line at 36 cm form incisors     Cardia:	Normal. fundus normal     Body: greater curvature one area of  mild erythema bx taken # 2  no ulceration seen lesser curvature normal     Antrum:	Normal    Pylorus:	Normal.    Duodenal Bulb:	Normal     2nd portion:	Normal bx # 1    3rd portion:	Normal.    EBL:0      Impression:  mild gastritis     Plan: 1- fu pathology for further recommendations       Time Started:    8:44am                                Time Ended: 8:49am scope 6578      Attending:     Faizan Moore M.D., F.A.C.P.

## 2020-11-12 PROCEDURE — 88312 SPECIAL STAINS GROUP 1: CPT

## 2020-11-12 PROCEDURE — 88305 TISSUE EXAM BY PATHOLOGIST: CPT

## 2020-11-12 PROCEDURE — 43239 EGD BIOPSY SINGLE/MULTIPLE: CPT

## 2021-01-13 ENCOUNTER — APPOINTMENT (OUTPATIENT)
Dept: NEUROLOGY | Facility: CLINIC | Age: 31
End: 2021-01-13

## 2021-02-03 ENCOUNTER — APPOINTMENT (OUTPATIENT)
Dept: INTERNAL MEDICINE | Facility: CLINIC | Age: 31
End: 2021-02-03

## 2021-02-21 ENCOUNTER — EMERGENCY (EMERGENCY)
Facility: HOSPITAL | Age: 31
LOS: 0 days | Discharge: HOME | End: 2021-02-21
Attending: EMERGENCY MEDICINE
Payer: COMMERCIAL

## 2021-02-21 VITALS
RESPIRATION RATE: 18 BRPM | OXYGEN SATURATION: 97 % | TEMPERATURE: 98 F | DIASTOLIC BLOOD PRESSURE: 80 MMHG | SYSTOLIC BLOOD PRESSURE: 129 MMHG | HEART RATE: 106 BPM | WEIGHT: 259.93 LBS | HEIGHT: 64 IN

## 2021-02-21 VITALS
DIASTOLIC BLOOD PRESSURE: 58 MMHG | RESPIRATION RATE: 18 BRPM | HEART RATE: 89 BPM | OXYGEN SATURATION: 98 % | SYSTOLIC BLOOD PRESSURE: 118 MMHG

## 2021-02-21 DIAGNOSIS — E66.9 OBESITY, UNSPECIFIED: ICD-10-CM
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
## 2021-02-21 DIAGNOSIS — Z86.718 PERSONAL HISTORY OF OTHER VENOUS THROMBOSIS AND EMBOLISM: ICD-10-CM

## 2021-02-21 DIAGNOSIS — R07.9 CHEST PAIN, UNSPECIFIED: ICD-10-CM

## 2021-02-21 DIAGNOSIS — Z91.013 ALLERGY TO SEAFOOD: ICD-10-CM

## 2021-02-21 DIAGNOSIS — K21.9 GASTRO-ESOPHAGEAL REFLUX DISEASE WITHOUT ESOPHAGITIS: ICD-10-CM
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
## 2021-02-21 DIAGNOSIS — R07.89 OTHER CHEST PAIN: ICD-10-CM

## 2021-02-21 PROCEDURE — 71045 X-RAY EXAM CHEST 1 VIEW: CPT | Mod: 26

## 2021-02-21 PROCEDURE — 99284 EMERGENCY DEPT VISIT MOD MDM: CPT

## 2021-02-21 PROCEDURE — 93010 ELECTROCARDIOGRAM REPORT: CPT

## 2021-02-21 RX ORDER — IBUPROFEN 200 MG
600 TABLET ORAL ONCE
Refills: 0 | Status: COMPLETED | OUTPATIENT
Start: 2021-02-21 | End: 2021-02-21

## 2021-02-21 RX ORDER — CYCLOBENZAPRINE HYDROCHLORIDE 10 MG/1
1 TABLET, FILM COATED ORAL
Qty: 15 | Refills: 0
Start: 2021-02-21 | End: 2021-02-25

## 2021-02-21 RX ORDER — CYCLOBENZAPRINE HYDROCHLORIDE 10 MG/1
10 TABLET, FILM COATED ORAL ONCE
Refills: 0 | Status: COMPLETED | OUTPATIENT
Start: 2021-02-21 | End: 2021-02-21

## 2021-02-21 RX ADMIN — Medication 600 MILLIGRAM(S): at 11:30

## 2021-02-21 RX ADMIN — CYCLOBENZAPRINE HYDROCHLORIDE 10 MILLIGRAM(S): 10 TABLET, FILM COATED ORAL at 11:30

## 2021-02-21 RX ADMIN — Medication 30 MILLILITER(S): at 11:32
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
## 2021-02-21 NOTE — ED PROVIDER NOTE - PATIENT PORTAL LINK FT
You can access the FollowMyHealth Patient Portal offered by [facility] by registering at the following website: [email or web address]. By joining [facility]’s FollowMyHealth portal, you will also be able to view your health information using other applications (apps) compatible with our system.

## 2021-02-21 NOTE — ED PROVIDER NOTE - PROGRESS NOTE DETAILS
PT is feeling better after medications, ECG and cxr negative on my read. Pt ready for d/c. Return precautions given.
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
## 2021-02-21 NOTE — ED PROVIDER NOTE - MUSCULOSKELETAL, MLM
Spine appears normal, range of motion is not limited. Straight leg raise is negative, no snuffbox tenderness on the left wrist. No swelling.
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
## 2021-04-07 ENCOUNTER — APPOINTMENT (OUTPATIENT)
Dept: OBGYN | Facility: CLINIC | Age: 31
End: 2021-04-07
Payer: COMMERCIAL

## 2021-04-07 VITALS
OXYGEN SATURATION: 97 % | DIASTOLIC BLOOD PRESSURE: 63 MMHG | HEART RATE: 70 BPM | RESPIRATION RATE: 16 BRPM | SYSTOLIC BLOOD PRESSURE: 100 MMHG | BODY MASS INDEX: 46.15 KG/M2 | WEIGHT: 277 LBS | HEIGHT: 65 IN | TEMPERATURE: 97.7 F

## 2021-04-07 DIAGNOSIS — B96.89 ACUTE VAGINITIS: ICD-10-CM

## 2021-04-07 DIAGNOSIS — N76.0 ACUTE VAGINITIS: ICD-10-CM

## 2021-04-07 PROCEDURE — 36415 COLL VENOUS BLD VENIPUNCTURE: CPT

## 2021-04-07 PROCEDURE — 99204 OFFICE O/P NEW MOD 45 MIN: CPT | Mod: 25

## 2021-04-07 PROCEDURE — 99072 ADDL SUPL MATRL&STAF TM PHE: CPT

## 2021-04-07 NOTE — DISCUSSION/SUMMARY
[FreeTextEntry1] : will call Dr. Rutherford's office to obtain records for review.\par day 7 hcg to be done this saturday. will trend to make sure appropriate drop after MTX and follow until negative. MTX precautions provided. \par ruptured ectopic pregnancy precautions were reviewed with patient, pt verbalized understanding and will go to ED for any severe pain, n/v, ssx of anemia.\par lab referral provided for hcg.\par follow up plan pending results.\par Edwin BOUDREAUX

## 2021-04-07 NOTE — HISTORY OF PRESENT ILLNESS
[FreeTextEntry1] : 31yo  LMP 2/19/2021 here for ectopic pregnancy s/p MTX 4 days ago.\par she is a patient of Dr. Rutherford but is transferring care.\par her initial visit was 3/19, she's been followed every 2 days for bhcg and ultrasound.\par her hcg has been steady at mid 100s. it was 171 (4/1) and 141 (3/30). \par pelvic Ultrasound was done 3/19 and 4/1, both did not show IUP nor adnexal mass.\par she received her 1st MTX injection Saturday 4/3/21 for presumed ectopic pregnancy. \par pt submitted medical release form to be sent to my office but records are not received yet. \par \par reports since MTX she has some cramping and bleeding.\par cramping is managed with tylenol. bleeding is light. \par denies severe pain, n/v. or heavy bleeding or ssx of anemia. \par \par \par She has a hx of unprovoked LLE DVT 2016. she was followed by heme w/ negative w/up. was on AC for about a month per pt report. \par \par pcp= Dr. Guillaume. \par

## 2021-04-09 PROBLEM — N76.0 BACTERIAL VAGINOSIS: Status: ACTIVE | Noted: 2021-04-09

## 2021-04-09 RX ORDER — METRONIDAZOLE 7.5 MG/G
0.75 GEL VAGINAL
Qty: 1 | Refills: 0 | Status: ACTIVE | COMMUNITY
Start: 2021-04-09 | End: 1900-01-01

## 2021-04-12 ENCOUNTER — NON-APPOINTMENT (OUTPATIENT)
Age: 31
End: 2021-04-12

## 2021-04-12 LAB — HCG SERPL-MCNC: 3 MIU/ML

## 2021-04-16 ENCOUNTER — APPOINTMENT (OUTPATIENT)
Dept: OBGYN | Facility: CLINIC | Age: 31
End: 2021-04-16
Payer: COMMERCIAL

## 2021-04-16 VITALS
OXYGEN SATURATION: 97 % | HEART RATE: 76 BPM | SYSTOLIC BLOOD PRESSURE: 114 MMHG | BODY MASS INDEX: 46.15 KG/M2 | DIASTOLIC BLOOD PRESSURE: 70 MMHG | HEIGHT: 65 IN | WEIGHT: 277 LBS | TEMPERATURE: 97.1 F | RESPIRATION RATE: 14 BRPM

## 2021-04-16 DIAGNOSIS — O00.90 UNSPECIFIED. ECTOPIC. PREGNANCY WITHOUT INTRAUTERINE PREGNANCY: ICD-10-CM

## 2021-04-16 PROCEDURE — 99072 ADDL SUPL MATRL&STAF TM PHE: CPT

## 2021-04-16 PROCEDURE — 99214 OFFICE O/P EST MOD 30 MIN: CPT | Mod: 25

## 2021-04-16 PROCEDURE — 36415 COLL VENOUS BLD VENIPUNCTURE: CPT

## 2021-04-16 NOTE — DISCUSSION/SUMMARY
[FreeTextEntry1] : repeat hcg today, if still negative will stop trending.\par conception plans after MTX discussed. discussed MTX is teratogenic but there has been no study addressing the earliest time to conceive after MTX treatment after ectopic pregnancy. d/w pt that even brianna there is no established safe interval from MTX treatment to conception we generally advise to avoid pregnancy for at least 3-6 months. pt reports she would like to wait 6 months. contraception options discussed. given hx of unprovoked DVT discussed not good candidate for estrogen containing contraception due to increased risk of VTE. recommended progesterone vs. copper IUD and r/b/a discussed for each option. pt unsure at this time and for now plans to abstain from intercourse. advised condom use if she decides not to use any other birth control. advised against only utilizing pull out or rhythm method. emergency contraception disucssed, rx sent for ullipristsal for EC in case of need. instructions provided. \par RTC 8/2021 for annual gyn exam and preconception counseling\par Edwin BOUDREAUX

## 2021-04-19 LAB — HCG SERPL-MCNC: <1 MIU/ML

## 2021-07-08 ENCOUNTER — TRANSCRIPTION ENCOUNTER (OUTPATIENT)
Age: 31
End: 2021-07-08

## 2021-07-08 RX ORDER — SUCRALFATE 1 G/1
1 TABLET ORAL 4 TIMES DAILY
Qty: 360 | Refills: 2 | Status: ACTIVE | COMMUNITY
Start: 2021-07-08 | End: 1900-01-01

## 2021-07-08 RX ORDER — OMEPRAZOLE 40 MG/1
40 CAPSULE, DELAYED RELEASE ORAL
Qty: 1 | Refills: 1 | Status: ACTIVE | COMMUNITY
Start: 2021-07-08 | End: 1900-01-01

## 2021-08-16 ENCOUNTER — APPOINTMENT (OUTPATIENT)
Dept: OBGYN | Facility: CLINIC | Age: 31
End: 2021-08-16
Payer: SELF-PAY

## 2021-08-16 VITALS
SYSTOLIC BLOOD PRESSURE: 130 MMHG | HEART RATE: 100 BPM | DIASTOLIC BLOOD PRESSURE: 82 MMHG | OXYGEN SATURATION: 97 % | TEMPERATURE: 95.6 F | BODY MASS INDEX: 45.98 KG/M2 | HEIGHT: 65 IN | WEIGHT: 276 LBS

## 2021-08-16 DIAGNOSIS — Z01.419 ENCOUNTER FOR GYNECOLOGICAL EXAMINATION (GENERAL) (ROUTINE) W/OUT ABNORMAL FINDINGS: ICD-10-CM

## 2021-08-16 PROCEDURE — G0101: CPT

## 2021-08-16 PROCEDURE — 99395 PREV VISIT EST AGE 18-39: CPT | Mod: 25

## 2021-08-18 LAB — HPV HIGH+LOW RISK DNA PNL CVX: NOT DETECTED

## 2021-08-20 LAB — CYTOLOGY CVX/VAG DOC THIN PREP: ABNORMAL

## 2021-08-23 NOTE — HISTORY OF PRESENT ILLNESS
[FreeTextEntry1] : patient here for annual gyn exam.\par no changes or new complaints since last visit. \par unsure how current relationship with boyfriend is going to be. using condoms for contraception. she thought about options discussed during last visit wants to use condoms instead. considering elective egg freezing. \par LMP 8/11/21

## 2021-08-23 NOTE — DISCUSSION/SUMMARY
[FreeTextEntry1] : [] pap/hpv\par [] elective egg freezing discussed \par [] condoms for contraception at this time, advised to consider LARC\par RTC in 1yr or sooner prn\par Edwin BOUDREAUX\par \par addendum:\par patient was called to discuss weight and weight loss.\par lifestyle and diet modifications discussed in detail. goal weight of 255 lbs (20 lb loss for 3 months) set. \par pt to f/up in 3months to f/up progress

## 2022-04-11 PROBLEM — Z11.59 SCREENING FOR VIRAL DISEASE: Status: ACTIVE | Noted: 2020-08-26

## 2022-04-12 ENCOUNTER — EMERGENCY (EMERGENCY)
Facility: HOSPITAL | Age: 32
LOS: 0 days | Discharge: HOME | End: 2022-04-13
Attending: STUDENT IN AN ORGANIZED HEALTH CARE EDUCATION/TRAINING PROGRAM
Payer: MEDICAID

## 2022-04-12 VITALS
HEIGHT: 65 IN | DIASTOLIC BLOOD PRESSURE: 83 MMHG | WEIGHT: 259.93 LBS | OXYGEN SATURATION: 98 % | RESPIRATION RATE: 18 BRPM | HEART RATE: 83 BPM | SYSTOLIC BLOOD PRESSURE: 131 MMHG | TEMPERATURE: 98 F

## 2022-04-12 DIAGNOSIS — Z79.01 LONG TERM (CURRENT) USE OF ANTICOAGULANTS: ICD-10-CM

## 2022-04-12 DIAGNOSIS — Z28.310 UNVACCINATED FOR COVID-19: ICD-10-CM

## 2022-04-12 DIAGNOSIS — M79.671 PAIN IN RIGHT FOOT: ICD-10-CM

## 2022-04-12 DIAGNOSIS — I82.411 ACUTE EMBOLISM AND THROMBOSIS OF RIGHT FEMORAL VEIN: ICD-10-CM

## 2022-04-12 DIAGNOSIS — Z91.013 ALLERGY TO SEAFOOD: ICD-10-CM

## 2022-04-12 PROCEDURE — 99284 EMERGENCY DEPT VISIT MOD MDM: CPT

## 2022-04-12 NOTE — ED ADULT NURSE NOTE - NSIMPLEMENTINTERV_GEN_ALL_ED
Implemented All Universal Safety Interventions:  [place] to call system. Call bell, personal items and telephone within reach. Instruct patient to call for assistance. Room bathroom lighting operational. Non-slip footwear when patient is off stretcher. Physically safe environment: no spills, clutter or unnecessary equipment. Stretcher in lowest position, wheels locked, appropriate side rails in place.
No

## 2022-04-12 NOTE — ED PROVIDER NOTE - PATIENT PORTAL LINK FT
You can access the FollowMyHealth Patient Portal offered by [facility] by registering at the following website: [email or web address]. By joining [facility]’s FollowMyHealth portal, you will also be able to view your health information using other applications (apps) compatible with our system.

## 2022-04-12 NOTE — ED PROVIDER NOTE - CLINICAL SUMMARY MEDICAL DECISION MAKING FREE TEXT BOX
pt presents with right foot pain after a long drive, sono pending, case signed out to the oncoming physician

## 2022-04-12 NOTE — ED PROVIDER NOTE - NSICDXPASTMEDICALHX_GEN_ALL_CORE_FT
PAST MEDICAL HISTORY:  DVT (deep venous thrombosis)     GERD (gastroesophageal reflux disease)     Obesity (BMI 30-39.9)     Ulcer

## 2022-04-12 NOTE — ED PROVIDER NOTE - NSFOLLOWUPINSTRUCTIONS_ED_ALL_ED_FT
1) Please follow-up with your primary care doctor within the next 3 days.  If you cannot follow-up with your doctor(s), please return to the ED for any urgent issues.  2) If you have any worsening of symptoms or any other concerns please return to the ED immediately.  3) Please continue taking your home medications as directed.  4) You may have been given a copy of your labs and/or imaging.  Please go over these with your primary care doctor.   5) A prescription has been sent to your pharmacy. Please take it as directed.       Deep Vein Thrombosis    A deep vein thrombosis (DVT) is a blood clot (thrombus) that usually occurs in a deep, larger vein of the lower leg or the pelvis, or in an upper extremity such as the arm. These are dangerous and can lead to serious and even life-threatening complications if the clot travels to the lungs. Symptoms include swelling of the arm or leg, warmth and redness of the arm or leg, and pain. Treatment may include taking a blood thinning medication; make sure to take anything prescribed as instructed.    SEEK IMMEDIATE MEDICAL CARE IF YOU HAVE ANY OF THE FOLLOWING SYMPTOMS: shortness of breath, chest pain, rapid or irregular heartbeat, lightheadedness/dizziness, coughing up blood, or any bleeding in your vomit, stool, or urine. These symptoms may represent a serious problem that is an emergency. Do not wait to see if the symptoms will go away. Call 911 and do not drive yourself to the hospital.

## 2022-04-12 NOTE — ED ADULT TRIAGE NOTE - CHIEF COMPLAINT QUOTE
Patient c/o right foot pain since yesterday. Denies n/v/d/fevers. Denies trauma. PMH DVT 2016, ulcer.

## 2022-04-12 NOTE — ED ADULT NURSE NOTE - ED STAT RN HANDOFF DETAILS
Report received from RN . Safety checks completed this shift. Safety rounds completed hourly. NO IV. Fall & skin precautions in place. Will continue to montior. Pending Ultrasound.

## 2022-04-12 NOTE — ED ADULT NURSE NOTE - ED STAT RN HANDOFF DETAILS 2
report given to EDWIN Calle. Safety checks completed this shift. Safety rounds completed hourly. NO IV . Fall & skin precautions in place. Any issues endorsed to RN Alva for follow up.

## 2022-04-12 NOTE — ED PROVIDER NOTE - OBJECTIVE STATEMENT
32 yo female w/PMH of GERD, Obesity, Ulcer, DVT x10/ 2016, was on blood thinners for x1-2 months, presents to the ED for right foot pain since yesterday. Pt is limping. Denies any trauma. Pt took a 3 hour drive from [place] yesterday. Denies any CP, SOB, N/V/D, fevers. No PSHx. Occasional smoker and drinker. Pt is not vaccinated for Covid. PMD Dr. Guillaume.
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
## 2022-04-12 NOTE — ED PROVIDER NOTE - PROGRESS NOTE DETAILS
Eduardo Mckeon, DO: dvt dx discussed with patient. explained need for AC for next 3 months; pt expressed understanding. expressed understanding for need to f/u with PMD for rest of xarelto script. this is patients 2nd DVT and explains symptoms are similar to those of last DVT.

## 2022-04-13 VITALS
TEMPERATURE: 98 F | HEART RATE: 87 BPM | RESPIRATION RATE: 16 BRPM | SYSTOLIC BLOOD PRESSURE: 105 MMHG | OXYGEN SATURATION: 98 % | DIASTOLIC BLOOD PRESSURE: 71 MMHG

## 2022-04-13 PROCEDURE — 93971 EXTREMITY STUDY: CPT | Mod: 26,RT

## 2022-04-13 RX ORDER — RIVAROXABAN 15 MG-20MG
15 KIT ORAL ONCE
Refills: 0 | Status: COMPLETED | OUTPATIENT
Start: 2022-04-13 | End: 2022-04-13

## 2022-04-13 RX ORDER — FONDAPARINUX SODIUM 2.5 MG/.5ML
1 INJECTION, SOLUTION SUBCUTANEOUS
Qty: 41 | Refills: 0
Start: 2022-04-13 | End: 2022-05-03

## 2022-04-13 RX ADMIN — RIVAROXABAN 15 MILLIGRAM(S): KIT at 02:29

## 2022-09-06 NOTE — ED ADULT NURSE NOTE - CINV DISCH TEACH PARTICIP
DR HUFF AWARE PT DIDN'T TAKE AMLODIPINE AND CARVEDILOL THIS AM.  ORDERS FOR PT TO HAVE BOTH MEDICINES. Patient

## 2023-07-25 NOTE — PLAN
-ve except as above
[FreeTextEntry1] : HbC, a beta globin chain mutation, is less soluble than HbA, forming hexagonal crystals (picture 2) rather than the long polymers seen in sickle cell disease (picture 3) [27]. More importantly, HbC, either in the homozygous state (HbCC) or in heterozygous states (HbSC, HbAC) induces red cell dehydration, resulting in red cells with an increased mean corpuscular hemoglobin concentration (MCHC) [17,28,29]. (See "Stomatocytosis and xerocytosis", section on 'Hemoglobin C disease'.)\par \par Hemoglobin C trait (HbAC) is a benign carrier state, while hemoglobin C disease (HbCC) causes a mild degree of hemolysis with splenomegaly, and a minimal to moderate anemia. In addition to the presence of crystals, target cells are numerous on the peripheral smear of patients with homozygous HbCC . This mutation is frequent in [place] and in geographical areas to which these populations have migrated. The presence of this mutation does not protect against uncomplicated malaria, but may protect against the more severe forms [30]. Alpha thalassemia – Alpha thalassemia is highly prevalent in [place], [facility], and [facility] [5]. Mild forms are also commonly encountered in individuals of  origin. Individuals with  ancestry may carry the alpha thalassemia-1 trait (ie, two alpha gene deletions in cis; aa/--); thus, these individuals are at greater risk of hydrops fetalis. Individuals of  ancestry typically carry the alpha thalassemia-2 trait (ie, a-/a-) and thus are unlikely to develop hydrops fetalis. (See 'Alpha thalassemias' below.)Patients with HbCC do not experience sickling events. \par
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
## 2025-01-13 NOTE — ED ADULT NURSE NOTE - NS ED NURSE LEVEL OF CONSCIOUSNESS MENTAL STATUS
Awake/Alert [Subsequent Evaluation] : a subsequent evaluation for [FreeTextEntry2] : Hearing for both ears